# Patient Record
Sex: MALE | Race: WHITE | Employment: FULL TIME | URBAN - METROPOLITAN AREA
[De-identification: names, ages, dates, MRNs, and addresses within clinical notes are randomized per-mention and may not be internally consistent; named-entity substitution may affect disease eponyms.]

---

## 2017-02-27 DIAGNOSIS — N40.1 BENIGN NON-NODULAR PROSTATIC HYPERPLASIA WITH LOWER URINARY TRACT SYMPTOMS: ICD-10-CM

## 2017-02-27 DIAGNOSIS — N52.9 ERECTILE DYSFUNCTION, UNSPECIFIED ERECTILE DYSFUNCTION TYPE: ICD-10-CM

## 2017-02-27 RX ORDER — TADALAFIL 5 MG
TABLET ORAL
Qty: 30 TABLET | Refills: 6 | Status: SHIPPED | OUTPATIENT
Start: 2017-02-27 | End: 2021-08-25

## 2018-09-18 ENCOUNTER — HOSPITAL ENCOUNTER (EMERGENCY)
Facility: CLINIC | Age: 53
Discharge: HOME | End: 2018-09-18
Attending: EMERGENCY MEDICINE
Payer: COMMERCIAL

## 2018-09-18 ENCOUNTER — APPOINTMENT (OUTPATIENT)
Dept: GENERAL RADIOLOGY | Facility: CLINIC | Age: 53
End: 2018-09-18
Payer: COMMERCIAL

## 2018-09-18 VITALS
DIASTOLIC BLOOD PRESSURE: 86 MMHG | HEART RATE: 82 BPM | TEMPERATURE: 98.2 F | OXYGEN SATURATION: 96 % | RESPIRATION RATE: 16 BRPM | SYSTOLIC BLOOD PRESSURE: 138 MMHG | WEIGHT: 165 LBS | HEIGHT: 68 IN | BODY MASS INDEX: 25.01 KG/M2

## 2018-09-18 DIAGNOSIS — S20.212A RIB CONTUSION, LEFT, INITIAL ENCOUNTER: Primary | ICD-10-CM

## 2018-09-18 PROCEDURE — 99283 EMERGENCY DEPT VISIT LOW MDM: CPT

## 2018-09-18 PROCEDURE — 71101 X-RAY EXAM UNILAT RIBS/CHEST: CPT

## 2018-09-18 RX ORDER — IBUPROFEN 800 MG/1
800 TABLET ORAL EVERY 8 HOURS PRN
Qty: 30 TABLET | Refills: 0 | Status: SHIPPED | OUTPATIENT
Start: 2018-09-18 | End: 2019-07-16

## 2018-09-18 ASSESSMENT — PAIN DESCRIPTION - PAIN TYPE: TYPE: ACUTE PAIN

## 2018-09-18 ASSESSMENT — PAIN DESCRIPTION - FREQUENCY: FREQUENCY: CONTINUOUS

## 2018-09-18 ASSESSMENT — PAIN DESCRIPTION - ORIENTATION: ORIENTATION: LEFT

## 2018-09-18 ASSESSMENT — PAIN DESCRIPTION - ONSET: ONSET: SUDDEN

## 2018-09-18 ASSESSMENT — PAIN DESCRIPTION - LOCATION: LOCATION: RIB CAGE

## 2018-09-18 ASSESSMENT — PAIN DESCRIPTION - PROGRESSION: CLINICAL_PROGRESSION: NOT CHANGED

## 2018-09-18 ASSESSMENT — PAIN DESCRIPTION - DESCRIPTORS: DESCRIPTORS: SORE

## 2018-09-18 NOTE — ED PROVIDER NOTES
blood pressure is 138/86 and his pulse is 82. His respiration is 16 and oxygen saturation is 96%. Physical Exam   Constitutional: He is oriented to person, place, and time. He appears well-developed and well-nourished. HENT:   Head: Normocephalic and atraumatic. Mouth/Throat: Oropharynx is clear and moist.   Eyes: Pupils are equal, round, and reactive to light. Conjunctivae and EOM are normal.   Neck: Normal range of motion. Neck supple. No tracheal deviation present. Cardiovascular: Normal rate, regular rhythm and intact distal pulses. Pulmonary/Chest: Effort normal and breath sounds normal. He exhibits tenderness. Left lateral ribs are tender to palpation. Anterior palpation to the chest does not reproduce any pain. Abdominal: Soft. Bowel sounds are normal. He exhibits no distension. There is no tenderness. Musculoskeletal: Normal range of motion. He exhibits no edema or tenderness. Neurological: He is alert and oriented to person, place, and time. Skin: Skin is warm and dry. No rash noted. Psychiatric: He has a normal mood and affect. His behavior is normal. Judgment and thought content normal.   Vitals reviewed. MDM:   Xr Ribs Left Include Chest (min 3 Views)    Result Date: 9/18/2018  EXAMINATION: 2 XRAY VIEWS OF THE LEFT RIBS WITH FRONTAL XRAY VIEW OF THE CHEST 9/18/2018 9:11 am COMPARISON: None. HISTORY: ORDERING SYSTEM PROVIDED HISTORY: left rib pain/trauma TECHNOLOGIST PROVIDED HISTORY: left rib pain/trauma Ordering Physician Provided Reason for Exam: Male, 47, c/o LT side rib pain s/p fall from 8 feet. Pain with cough. Acuity: Acute Type of Exam: Initial Mechanism of Injury: Fall FINDINGS: Frontal view of the chest and dedicated frontal and oblique views of the left chest wall are submitted for review. Cardiac silhouette is normal in size. Lung parenchyma is clear without focal airspace consolidation, sizeable pleural effusion, or pneumothorax. Trachea is midline. Dedicated images of the ribs are without evidence for displaced rib fracture. No radiographic evidence for left rib fracture. No acute cardiopulmonary pathology. Patient has been reassured and I will treat this patient symptomatically. The patient was given the following medications:  Orders Placed This Encounter   Medications    ibuprofen (ADVIL;MOTRIN) 800 MG tablet     Sig: Take 1 tablet by mouth every 8 hours as needed for Pain     Dispense:  30 tablet     Refill:  0          FINAL IMPRESSION      1.  Rib contusion, left, initial encounter          DISPOSITION/PLAN   DISPOSITION Decision To Discharge 09/18/2018 09:44:27 AM      Condition on Disposition  Good    PATIENT REFERRED TO:  Jojo Allen MD  [address] [place] [address]    Schedule an appointment as soon as possible for a visit in 2 days        DISCHARGE MEDICATIONS:  New Prescriptions    IBUPROFEN (ADVIL;MOTRIN) 800 MG TABLET    Take 1 tablet by mouth every 8 hours as needed for Pain       (Please note that portions of this note were completed with a voice recognition program.  Efforts were made to edit the dictations but occasionally words are mis-transcribed.)    Hollis Chambers MD, F.A.C.E.P, F.A.A.E.M  Emergency Physician Attending          Hollis Chambers MD  09/18/18 [id number]

## 2019-07-16 ENCOUNTER — APPOINTMENT (OUTPATIENT)
Dept: CT IMAGING | Facility: CLINIC | Age: 54
End: 2019-07-16
Payer: COMMERCIAL

## 2019-07-16 ENCOUNTER — APPOINTMENT (OUTPATIENT)
Dept: GENERAL RADIOLOGY | Facility: CLINIC | Age: 54
End: 2019-07-16
Payer: COMMERCIAL

## 2019-07-16 ENCOUNTER — HOSPITAL ENCOUNTER (EMERGENCY)
Facility: CLINIC | Age: 54
Discharge: HOME | End: 2019-07-16
Attending: EMERGENCY MEDICINE
Payer: COMMERCIAL

## 2019-07-16 VITALS
BODY MASS INDEX: 25.62 KG/M2 | HEIGHT: 69 IN | SYSTOLIC BLOOD PRESSURE: 133 MMHG | RESPIRATION RATE: 22 BRPM | WEIGHT: 173 LBS | TEMPERATURE: 98.2 F | HEART RATE: 75 BPM | DIASTOLIC BLOOD PRESSURE: 96 MMHG | OXYGEN SATURATION: 99 %

## 2019-07-16 DIAGNOSIS — R05.9 COUGH: Primary | ICD-10-CM

## 2019-07-16 LAB
ABSOLUTE EOS #: 0.1 K/UL (ref 0–0.4)
ABSOLUTE IMMATURE GRANULOCYTE: ABNORMAL K/UL (ref 0–0.3)
ABSOLUTE LYMPH #: 1.7 K/UL (ref 1–4.8)
ABSOLUTE MONO #: 0.7 K/UL (ref 0.1–1.2)
ANION GAP SERPL CALCULATED.3IONS-SCNC: 10 MMOL/L (ref 9–17)
BASOPHILS # BLD: 1 % (ref 0–2)
BASOPHILS ABSOLUTE: 0.1 K/UL (ref 0–0.2)
BUN BLDV-MCNC: 15 MG/DL (ref 6–20)
BUN/CREAT BLD: ABNORMAL (ref 9–20)
CALCIUM SERPL-MCNC: 9.3 MG/DL (ref 8.6–10.4)
CHLORIDE BLD-SCNC: 104 MMOL/L (ref 98–107)
CO2: 26 MMOL/L (ref 20–31)
CREAT SERPL-MCNC: 0.7 MG/DL (ref 0.7–1.2)
DIFFERENTIAL TYPE: ABNORMAL
EOSINOPHILS RELATIVE PERCENT: 1 % (ref 1–4)
GFR AFRICAN AMERICAN: >60 ML/MIN
GFR NON-AFRICAN AMERICAN: >60 ML/MIN
GFR SERPL CREATININE-BSD FRML MDRD: ABNORMAL ML/MIN/{1.73_M2}
GFR SERPL CREATININE-BSD FRML MDRD: ABNORMAL ML/MIN/{1.73_M2}
GLUCOSE BLD-MCNC: 168 MG/DL (ref 70–99)
HCT VFR BLD CALC: 40 % (ref 41–53)
HEMOGLOBIN: 13.3 G/DL (ref 13.5–17.5)
IMMATURE GRANULOCYTES: ABNORMAL %
LYMPHOCYTES # BLD: 17 % (ref 24–44)
MCH RBC QN AUTO: 30.2 PG (ref 26–34)
MCHC RBC AUTO-ENTMCNC: 33.3 G/DL (ref 31–37)
MCV RBC AUTO: 90.6 FL (ref 80–100)
MONOCYTES # BLD: 7 % (ref 2–11)
NRBC AUTOMATED: ABNORMAL PER 100 WBC
PDW BLD-RTO: 13.2 % (ref 12.5–15.4)
PLATELET # BLD: 343 K/UL (ref 140–450)
PLATELET ESTIMATE: ABNORMAL
PMV BLD AUTO: 7 FL (ref 6–12)
POTASSIUM SERPL-SCNC: 3.9 MMOL/L (ref 3.7–5.3)
RBC # BLD: 4.41 M/UL (ref 4.5–5.9)
RBC # BLD: ABNORMAL 10*6/UL
SEG NEUTROPHILS: 74 % (ref 36–66)
SEGMENTED NEUTROPHILS ABSOLUTE COUNT: 7.6 K/UL (ref 1.8–7.7)
SODIUM BLD-SCNC: 140 MMOL/L (ref 135–144)
WBC # BLD: 10.2 K/UL (ref 3.5–11)
WBC # BLD: ABNORMAL 10*3/UL

## 2019-07-16 PROCEDURE — 85025 COMPLETE CBC W/AUTO DIFF WBC: CPT

## 2019-07-16 PROCEDURE — 71250 CT THORAX DX C-: CPT

## 2019-07-16 PROCEDURE — 80048 BASIC METABOLIC PNL TOTAL CA: CPT

## 2019-07-16 PROCEDURE — 99284 EMERGENCY DEPT VISIT MOD MDM: CPT

## 2019-07-16 PROCEDURE — 36415 COLL VENOUS BLD VENIPUNCTURE: CPT

## 2019-07-16 PROCEDURE — 71046 X-RAY EXAM CHEST 2 VIEWS: CPT

## 2019-07-16 RX ORDER — BENZONATATE 100 MG/1
100 CAPSULE ORAL 3 TIMES DAILY PRN
Qty: 30 CAPSULE | Refills: 0 | Status: SHIPPED | OUTPATIENT
Start: 2019-07-16 | End: 2019-07-23

## 2019-07-16 RX ORDER — PREDNISONE 50 MG/1
50 TABLET ORAL DAILY
Qty: 5 TABLET | Refills: 0 | Status: SHIPPED | OUTPATIENT
Start: 2019-07-16 | End: 2021-08-25

## 2019-07-16 RX ORDER — ALBUTEROL SULFATE 90 UG/1
2 AEROSOL, METERED RESPIRATORY (INHALATION) 4 TIMES DAILY
Qty: 1 INHALER | Refills: 0 | Status: SHIPPED | OUTPATIENT
Start: 2019-07-16 | End: 2021-08-25

## 2019-07-16 ASSESSMENT — PAIN SCALES - GENERAL: PAINLEVEL_OUTOF10: 5

## 2019-07-16 ASSESSMENT — PAIN DESCRIPTION - LOCATION: LOCATION: ABDOMEN

## 2019-07-16 ASSESSMENT — PAIN DESCRIPTION - FREQUENCY: FREQUENCY: INTERMITTENT

## 2019-07-16 NOTE — ED PROVIDER NOTES
Eosinophils % 1 1 - 4 %    Basophils 1 0 - 2 %    Immature Granulocytes NOT REPORTED 0 %    Segs Absolute 7.60 1.8 - 7.7 k/uL    Absolute Lymph # 1.70 1.0 - 4.8 k/uL    Absolute Mono # 0.70 0.1 - 1.2 k/uL    Absolute Eos # 0.10 0.0 - 0.4 k/uL    Basophils # 0.10 0.0 - 0.2 k/uL    Absolute Immature Granulocyte NOT REPORTED 0.00 - 0.30 k/uL    WBC Morphology NOT REPORTED     RBC Morphology NOT REPORTED     Platelet Estimate NOT REPORTED    Basic Metabolic Panel   Result Value Ref Range    Glucose 168 (H) 70 - 99 mg/dL    BUN 15 6 - 20 mg/dL    CREATININE 0.70 0.70 - 1.20 mg/dL    Bun/Cre Ratio NOT REPORTED 9 - 20    Calcium 9.3 8.6 - 10.4 mg/dL    Sodium 140 135 - 144 mmol/L    Potassium 3.9 3.7 - 5.3 mmol/L    Chloride 104 98 - 107 mmol/L    CO2 26 20 - 31 mmol/L    Anion Gap 10 9 - 17 mmol/L    GFR Non-African American >60 >60 mL/min    GFR African American >60 >60 mL/min    GFR Comment          GFR Staging NOT REPORTED        Not indicated unless otherwise documented above    RADIOLOGY:   I reviewed the radiologist interpretations:    CT CHEST WO CONTRAST   Preliminary Result   Pleural thickening seen within the left lung base with calcifications seen   along the pleural surface may be secondary to prior asbestos exposure      Bilateral pulmonary nodules largest measuring 7 mm in diameter      Left-sided pleural effusion      RECOMMENDATIONS:   Multiple pulmonary nodules. Most severe: 7.0 mm solid pulmonary nodule. Recommend a non-contrast Chest CT at 3-6 months, then consider another   non-contrast Chest CT at 18-24 months. These guidelines do not apply to patients younger than 35 years,   immunocompromised patients, and patients with cancer. Follow up in patients   with significant comorbidities as clinically warranted. For lung cancer   screening, adhere to Lung-RADS guidelines. Reference: Radiology. 2017;   284(1):228-43.          XR CHEST STANDARD (2 VW)   Final Result   Small left effusion and

## 2021-08-25 ENCOUNTER — OFFICE VISIT (OUTPATIENT)
Dept: FAMILY MEDICINE CLINIC | Age: 56
End: 2021-08-25
Payer: COMMERCIAL

## 2021-08-25 VITALS
TEMPERATURE: 98.1 F | BODY MASS INDEX: 23.91 KG/M2 | HEART RATE: 82 BPM | DIASTOLIC BLOOD PRESSURE: 60 MMHG | OXYGEN SATURATION: 96 % | HEIGHT: 70 IN | SYSTOLIC BLOOD PRESSURE: 102 MMHG | WEIGHT: 167 LBS

## 2021-08-25 DIAGNOSIS — N40.0 BENIGN PROSTATIC HYPERPLASIA, UNSPECIFIED WHETHER LOWER URINARY TRACT SYMPTOMS PRESENT: ICD-10-CM

## 2021-08-25 DIAGNOSIS — M54.41 CHRONIC RIGHT-SIDED LOW BACK PAIN WITH RIGHT-SIDED SCIATICA: ICD-10-CM

## 2021-08-25 DIAGNOSIS — Z23 IMMUNIZATION DUE: ICD-10-CM

## 2021-08-25 DIAGNOSIS — Z12.11 SCREENING FOR COLON CANCER: ICD-10-CM

## 2021-08-25 DIAGNOSIS — G89.29 CHRONIC RIGHT-SIDED LOW BACK PAIN WITH RIGHT-SIDED SCIATICA: ICD-10-CM

## 2021-08-25 DIAGNOSIS — N52.9 ERECTILE DYSFUNCTION, UNSPECIFIED ERECTILE DYSFUNCTION TYPE: ICD-10-CM

## 2021-08-25 DIAGNOSIS — Z11.59 NEED FOR HEPATITIS C SCREENING TEST: ICD-10-CM

## 2021-08-25 DIAGNOSIS — H00.012 HORDEOLUM EXTERNUM OF RIGHT LOWER EYELID: ICD-10-CM

## 2021-08-25 DIAGNOSIS — Z76.89 ENCOUNTER TO ESTABLISH CARE WITH NEW DOCTOR: ICD-10-CM

## 2021-08-25 DIAGNOSIS — E78.5 DYSLIPIDEMIA: Primary | ICD-10-CM

## 2021-08-25 DIAGNOSIS — E11.9 TYPE 2 DIABETES MELLITUS WITHOUT COMPLICATION, WITHOUT LONG-TERM CURRENT USE OF INSULIN (HCC): ICD-10-CM

## 2021-08-25 PROCEDURE — 90471 IMMUNIZATION ADMIN: CPT | Performed by: NURSE PRACTITIONER

## 2021-08-25 PROCEDURE — 90715 TDAP VACCINE 7 YRS/> IM: CPT | Performed by: NURSE PRACTITIONER

## 2021-08-25 PROCEDURE — 99204 OFFICE O/P NEW MOD 45 MIN: CPT | Performed by: NURSE PRACTITIONER

## 2021-08-25 RX ORDER — ERYTHROMYCIN 5 MG/G
OINTMENT OPHTHALMIC
Qty: 1 TUBE | Refills: 0 | Status: SHIPPED | OUTPATIENT
Start: 2021-08-25 | End: 2021-09-04

## 2021-08-25 RX ORDER — NAPROXEN 500 MG/1
500 TABLET ORAL 2 TIMES DAILY WITH MEALS
Qty: 60 TABLET | Refills: 0 | Status: SHIPPED | OUTPATIENT
Start: 2021-08-25 | End: 2022-03-28

## 2021-08-25 SDOH — ECONOMIC STABILITY: FOOD INSECURITY: WITHIN THE PAST 12 MONTHS, YOU WORRIED THAT YOUR FOOD WOULD RUN OUT BEFORE YOU GOT MONEY TO BUY MORE.: NEVER TRUE

## 2021-08-25 SDOH — ECONOMIC STABILITY: TRANSPORTATION INSECURITY
IN THE PAST 12 MONTHS, HAS THE LACK OF TRANSPORTATION KEPT YOU FROM MEDICAL APPOINTMENTS OR FROM GETTING MEDICATIONS?: NO

## 2021-08-25 SDOH — ECONOMIC STABILITY: FOOD INSECURITY: WITHIN THE PAST 12 MONTHS, THE FOOD YOU BOUGHT JUST DIDN'T LAST AND YOU DIDN'T HAVE MONEY TO GET MORE.: NEVER TRUE

## 2021-08-25 SDOH — ECONOMIC STABILITY: TRANSPORTATION INSECURITY
IN THE PAST 12 MONTHS, HAS LACK OF TRANSPORTATION KEPT YOU FROM MEETINGS, WORK, OR FROM GETTING THINGS NEEDED FOR DAILY LIVING?: NO

## 2021-08-25 ASSESSMENT — PATIENT HEALTH QUESTIONNAIRE - PHQ9
SUM OF ALL RESPONSES TO PHQ QUESTIONS 1-9: 0
SUM OF ALL RESPONSES TO PHQ QUESTIONS 1-9: 0
2. FEELING DOWN, DEPRESSED OR HOPELESS: 0
SUM OF ALL RESPONSES TO PHQ9 QUESTIONS 1 & 2: 0
SUM OF ALL RESPONSES TO PHQ QUESTIONS 1-9: 0
1. LITTLE INTEREST OR PLEASURE IN DOING THINGS: 0

## 2021-08-25 ASSESSMENT — SOCIAL DETERMINANTS OF HEALTH (SDOH): HOW HARD IS IT FOR YOU TO PAY FOR THE VERY BASICS LIKE FOOD, HOUSING, MEDICAL CARE, AND HEATING?: NOT HARD AT ALL

## 2021-08-25 NOTE — PROGRESS NOTES
[facility]  [id number] [facility]. [address]  [phone]  [phone]    Carmen Jain is a 55 y.o. male who is here with c/o of:    Chief Complaint: [address] (previous pcp Dr Tiffani Be) and Hip Pain (right hip pain, sxs 1.5 month)      Patient Accompanied by: n/a    HPI - Carmen Jain is here today with c/o:    Patient here to establish care. Previous PCP: Dr Tiffani Be  : No  Children: Yes  Employed: Local   Exercise: No stays active- does do exercises routinely for sciatic pain  Diet: Eats a balanced diet  Smoker: Yes; 1 pk per day  Alcohol: No  Sleep: Averages 6 hours    Chronic Conditions:    Hyperlipidemia  Diabetes  ED  BPH    Specialists:    None    Health Concerns:    Reports right lower back pain with right sided sciatica since he was 26years old. He says that the pain uses to radiate all the way down to his foot but now it radiates around down the thigh. Describes pain as sharp. He says he gets a cramping sensation and it makes it hard to do certain movements. Does not take any OTC medication for his pain. He has been doing sciatica stretches. Denies saddle anesthesia or any issues with urination.      Health Maintenance Due   Topic Date Due    Hepatitis C screen  Never done    Diabetic foot exam  Never done    Diabetic microalbuminuria test  Never done    Colon cancer screen colonoscopy  Never done    A1C test (Diabetic or Prediabetic)  11/24/2016    Lipid screen  11/24/2016    Low dose CT lung screening  07/16/2020        Patient Active Problem List:     Diabetes mellitus ([address]     Dyslipidemia     BPH (benign prostatic hyperplasia)     ED (erectile dysfunction)     Past Medical History:   Diagnosis Date    Erectile dysfunction       Past Surgical History:   Procedure Laterality Date    ARM SURGERY      left forearm     Family History   Problem Relation Age of Onset    Diabetes Father     Heart Disease Father    [facility] Coron[facility] Art Dis Father     Cancer Mother      Social History     Tobacco Use    Smoking status: Current Every Day Smoker     Packs/day: 1.00     Years: 40.00     Pack years: 40.00     Types: Cigarettes    Smokeless tobacco: Never Used   Substance Use Topics    Alcohol use: No     ALLERGIES:  No Known Allergies       Subjective   Review of Systems   · Constitutional:  Negative for activity change, appetite change,unexpected weight change, chills, fever, and fatigue. · HENT: Negative for ear pain, sore throat,  Rhinorrhea, sinus pain, sinus pressure, congestion. · Eyes:  Negative for pain and discharge. · Respiratory:  Negative for chest tightness, shortness of breath, wheezing, and cough. · Cardiovascular:  Negative for chest pain, palpitations and leg swelling. · Gastrointestinal: Negative for abdominal pain, blood in stool, constipation,diarrhea, nausea and vomiting. · Endocrine: Negative for cold intolerance, heat intolerance, polydipsia, polyphagia and polyuria. · Genitourinary: Negative for difficulty urinating, dysuria, flank pain, frequency, hematuria and urgency. · Musculoskeletal: Negative for arthralgias,joint swelling, myalgias, neck pain and neck stiffness. Positive for back pain  · Skin: Negative for rash and wound. · Allergic/Immunologic: Negative for environmental allergies and food allergies. · Neurological:  Negative for dizziness, light-headedness, numbness and headaches. · Hematological:  Negative for adenopathy. Does not bruise/bleed easily. · Psychiatric/Behavioral: Negative for self-injury, sleep disturbance and suicidal ideas. Objective   Physical Exam  Musculoskeletal:      Lumbar back: Tenderness present. Decreased range of motion. Positive right straight leg raise test.        PHYSICAL EXAM:   · Constitutional: Vannesa Garland is oriented to person, place, and time. Vital signs are normal. Appears well-developed and well-nourished.    · HEENT:   · Head: Normocephalic and atraumatic. Right Ear: Hearing and external ear normal. TM normal  Canal normal  · Left Ear: Hearing and external ear normal. TM normal Canal normal  · Nose: Nares normal. Septum midline. No drainage or sinus tenderness. Mucosa pink and moist  · Mouth/Throat: Oropharynx- No erythema, no exudate. Uvula midline, no erythema, no edema. Mucous membranes are pink and moist.   · Eyes:PERRL, EOMI, Conjunctiva normal, No discharge. · Neck: Full passive range of motion. Non-tender on palpation. Neck supple. No thyromegaly present. Trachea normal.  · Cardiovascular: Normal rate, regular rhythm, S1, S2, no murmur, no gallop, no friction rub, intact distal pulses. · Pulmonary/Chest: Breath sounds are clear throughout, No respiratory distress, No wheezing, No chest tenderness. Effort normal  · Abdominal: Soft. Normal appearance, bowel sounds are present and normoactive. There is no hepatosplenomegaly. There is no tenderness. There is no CVA tenderness. · Lymphadenopathy: No lymphadenopathy noted. · Neurological: Alert and oriented to person, place, and time. Normal motor function, Normal sensory function, No focal deficits noted. He has normal strength. · Skin: Skin is warm, dry and intact. No obvious lesions on exposed skin  · Psychiatric: Normal mood and affect. Speech is normal and behavior is normal.     Nursing note and vitals reviewed. Blood pressure 102/60, pulse 82, temperature 98.1 °F (36.7 °C), temperature source Temporal, height 5' 10\" (1.778 m), weight 167 lb (75.8 kg), SpO2 96 %. Body mass index is 23.96 kg/m².     Wt Readings from Last 3 Encounters:   08/25/21 167 lb (75.8 kg)   07/16/19 173 lb (78.5 kg)   09/18/18 165 lb (74.8 kg)     BP Readings from Last 3 Encounters:   08/25/21 102/60   07/16/19 (!) 133/96   09/18/18 138/86       Admission on 07/16/2019, Discharged on 07/16/2019   Component Date Value Ref Range Status    WBC 07/16/2019 10.2  3.5 - 11.0 k/uL Final    RBC 07/16/2019 4.41* 4.5 - 5.9 m/uL Final    Hemoglobin 07/16/2019 13.3* 13.5 - 17.5 g/dL Final    Hematocrit 07/16/2019 40.0* 41 - 53 % Final    MCV 07/16/2019 90.6  80 - 100 fL Final    MCH 07/16/2019 30.2  26 - 34 pg Final    MCHC 07/16/2019 33.3  31 - 37 g/dL Final    RDW 07/16/2019 13.2  12.5 - 15.4 % Final    Platelets 60/59/0755 343  140 - 450 k/uL Final    MPV 07/16/2019 7.0  6.0 - 12.0 fL Final    NRBC Automated 07/16/2019 NOT REPORTED  per 100 WBC Final    Differential Type 07/16/2019 NOT REPORTED   Final    Seg Neutrophils 07/16/2019 74* 36 - 66 % Final    Lymphocytes 07/16/2019 17* 24 - 44 % Final    Monocytes 07/16/2019 7  2 - 11 % Final    Eosinophils % 07/16/2019 1  1 - 4 % Final    Basophils 07/16/2019 1  0 - 2 % Final    Immature Granulocytes 07/16/2019 NOT REPORTED  0 % Final    Segs Absolute 07/16/2019 7.60  1.8 - 7.7 k/uL Final    Absolute Lymph # 07/16/2019 1.70  1.0 - 4.8 k/uL Final    Absolute Mono # 07/16/2019 0.70  0.1 - 1.2 k/uL Final    Absolute Eos # 07/16/2019 0.10  0.0 - 0.4 k/uL Final    Basophils Absolute 07/16/2019 0.10  0.0 - 0.2 k/uL Final    Absolute Immature Granulocyte 07/16/2019 NOT REPORTED  0.00 - 0.30 k/uL Final    WBC Morphology 07/16/2019 NOT REPORTED   Final    RBC Morphology 07/16/2019 NOT REPORTED   Final    Platelet Estimate 08/55/5366 NOT REPORTED   Final    Glucose 07/16/2019 168* 70 - 99 mg/dL Final    BUN 07/16/2019 15  6 - 20 mg/dL Final    CREATININE 07/16/2019 0.70  0.70 - 1.20 mg/dL Final    Bun/Cre Ratio 07/16/2019 NOT REPORTED  9 - 20 Final    Calcium 07/16/2019 9.3  8.6 - 10.4 mg/dL Final    Sodium 07/16/2019 140  135 - 144 mmol/L Final    Potassium 07/16/2019 3.9  3.7 - 5.3 mmol/L Final    Chloride 07/16/2019 104  98 - 107 mmol/L Final    CO2 07/16/2019 26  20 - 31 mmol/L Final    Anion Gap 07/16/2019 10  9 - 17 mmol/L Final    GFR Non- 07/16/2019 >60  >60 mL/min Final    GFR  07/16/2019 >60  >60 mL/min Final  GFR Comment 07/16/2019        Final    Comment: Average GFR for 52-63 years old:   80 mL/min/1.73sq m  Chronic Kidney Disease:   <60 mL/min/1.73sq m  Kidney failure:   <15 mL/min/1.73sq m              eGFR calculated using average adult body mass. Additional eGFR calculator available at:        [employer]            GFR Staging 07/16/2019 NOT REPORTED   Final     No results found for this visit on 08/25/21. Completed Orders/Prescriptions   Orders Placed This Encounter   Medications    naproxen (NAPROSYN) 500 MG tablet     Sig: Take 1 tablet by mouth 2 times daily (with meals)     Dispense:  60 tablet     Refill:  0    erythromycin (ROMYCIN) 5 MG/GM ophthalmic ointment     Sig: Apply thin ribbon along right lower eye lid 3 times daily     Dispense:  1 Tube     Refill:  0           Immunizations Administered     Name Date Dose Route    Tdap (Boostrix, Adacel) 8/25/2021 0.5 mL Intramuscular    Site: Deltoid- Left    Lot: [id number]    NDC: 31909-507-79          AssessmentPlan/Medical Decision Making     1. Dyslipidemia    - Low cholesterol diet recommended  - Lipid, Fasting; Future  - CBC Auto Differential; Future  - Comprehensive Metabolic Panel; Future  - TSH with Reflex; Future    2. Type 2 diabetes mellitus without complication, without long-term current use of insulin (HCC)    - CBC Auto Differential; Future  - Comprehensive Metabolic Panel; Future  - TSH with Reflex; Future  - Hemoglobin A1C; Future  - Microalbumin, Ur; Future    3. Chronic right-sided low back pain with right-sided sciatica    - XR LUMBAR SPINE (2-3 VIEWS); Future  - naproxen (NAPROSYN) 500 MG tablet; Take 1 tablet by mouth 2 times daily (with meals)  Dispense: 60 tablet; Refill: 0    4. Hordeolum externum of right lower eyelid    - Warm compresses recommended TID  - erythromycin (ROMYCIN) 5 MG/GM ophthalmic ointment;  Apply thin ribbon along right lower eye lid 3 times daily  Dispense: 1 Tube; Refill: 0    5. Benign prostatic hyperplasia, unspecified whether lower urinary tract symptoms present      6. Erectile dysfunction, unspecified erectile dysfunction type      7. Need for hepatitis C screening test    - Hepatitis C Antibody; Future    8. Immunization due    - Tdap (age 6y and older) IM ([address])    5. Screening for colon cancer    - Cologuard (For External Results Only); Future    10. Encounter to establish care with new doctor        Return in about 6 months (around 2/25/2022) for chronic conditions. 1.  Ramu received counseling on the following healthy behaviors: nutrition, exercise and tobacco cessation  2. Patient given educational materials - see patient instructions  3. Was a self-tracking handout given in paper form or via [employer]? No  If yes, see orders or list here. 4.  Discussed use, benefit, and side effects of prescribed medications. Barriers to medication compliance addressed. All patient questions answered. Pt voiced understanding. 5.  Reviewed prior labs, imaging, consultation, follow up, and health maintenance  6. Continue current medications, diet and exercise. 7. Discussed use, benefit, and side effects of prescribed medications. Barriers to medication compliance addressed. All her questions were answered. Pt voiced understanding. Silverio Peng will continue current medications, diet and exercise. Of the 45 minute duration appointment visit, Cherelle Hernadez CNP spent at least 50% of the face-to-face time in counseling, explanation of diagnosis, planning of further management, and answering all questions.           Signed:  Cherelle Hernadez CNP

## 2021-10-28 ENCOUNTER — TELEPHONE (OUTPATIENT)
Dept: FAMILY MEDICINE CLINIC | Age: 56
End: 2021-10-28

## 2021-10-28 NOTE — TELEPHONE ENCOUNTER
Patients provider ordered cologuard on 8/25/2021. Pt states he never received kit, I will refax order to [facility].

## 2022-03-28 ENCOUNTER — OFFICE VISIT (OUTPATIENT)
Dept: FAMILY MEDICINE CLINIC | Age: 57
End: 2022-03-28
Payer: COMMERCIAL

## 2022-03-28 VITALS
HEART RATE: 89 BPM | OXYGEN SATURATION: 97 % | BODY MASS INDEX: 24.68 KG/M2 | WEIGHT: 172 LBS | SYSTOLIC BLOOD PRESSURE: 148 MMHG | TEMPERATURE: 98 F | DIASTOLIC BLOOD PRESSURE: 84 MMHG

## 2022-03-28 DIAGNOSIS — E11.9 TYPE 2 DIABETES MELLITUS WITHOUT COMPLICATION, WITHOUT LONG-TERM CURRENT USE OF INSULIN (HCC): Primary | ICD-10-CM

## 2022-03-28 DIAGNOSIS — Z23 IMMUNIZATION DUE: ICD-10-CM

## 2022-03-28 DIAGNOSIS — Z12.11 SCREENING FOR MALIGNANT NEOPLASM OF COLON: ICD-10-CM

## 2022-03-28 DIAGNOSIS — E78.5 DYSLIPIDEMIA: ICD-10-CM

## 2022-03-28 PROCEDURE — 90732 PPSV23 VACC 2 YRS+ SUBQ/IM: CPT | Performed by: NURSE PRACTITIONER

## 2022-03-28 PROCEDURE — 99214 OFFICE O/P EST MOD 30 MIN: CPT | Performed by: NURSE PRACTITIONER

## 2022-03-28 PROCEDURE — 90471 IMMUNIZATION ADMIN: CPT | Performed by: NURSE PRACTITIONER

## 2022-03-28 NOTE — PROGRESS NOTES
[facility] Hospital[facility]  [id number] [facility]. [address]  [place] [address]  [phone]  [phone]    Brayan Rodriguez is a 55 y.o. male who is here with c/o of:    Chief Complaint: Diabetes      Patient Accompanied by: n/a    HPI - Brayan Rodriguez is here today with c/o:    Patient here for re evaluation of chronic conditions. Type 2 Diabetes-  No medication at this time. Does not check blood sugars. Tries to follow balanced diet. No exercise but does stay active. Hyperlipidemia-  No medication at this time. Due for labs. 1 pk per day smoker. Stays active and tries to follow a balanced diet. Elevated blood pressure-  Patients bp elevated. Denies chest pain, dizziness, shortness of breath, headaches or swelling. No hx of HTN.       Health Maintenance Due   Topic Date Due    Hepatitis C screen  Never done    Diabetic foot exam  Never done    Diabetic microalbuminuria test  Never done    Diabetic retinal exam  Never done    Colorectal Cancer Screen  Never done    A1C test (Diabetic or Prediabetic)  11/24/2016    Lipid screen  11/24/2016    Low dose CT lung screening  07/16/2020        Patient Active Problem List:     Diabetes mellitus ([address]     Dyslipidemia     BPH (benign prostatic hyperplasia)     ED (erectile dysfunction)     Past Medical History:   Diagnosis Date    Erectile dysfunction       Past Surgical History:   Procedure Laterality Date    ARM SURGERY      left forearm     Family History   Problem Relation Age of Onset    Diabetes Father     Heart Disease Father     Coronary Art Dis Father     Cancer Mother      Social History     Tobacco Use    Smoking status: Current Every Day Smoker     Packs/day: 1.00     Years: 40.00     Pack years: 40.00     Types: Cigarettes    Smokeless tobacco: Never Used   Substance Use Topics    Alcohol use: No     ALLERGIES:  No Known Allergies       Subjective   Review of Systems   · Constitutional:  Negative for activity change, appetite change,unexpected weight change, chills, fever, and fatigue. · HENT: Negative for ear pain, sore throat,  Rhinorrhea, sinus pain, sinus pressure, congestion. · Eyes:  Negative for pain and discharge. · Respiratory:  Negative for chest tightness, shortness of breath, wheezing, and cough. · Cardiovascular:  Negative for chest pain, palpitations and leg swelling. · Gastrointestinal: Negative for abdominal pain, blood in stool, constipation,diarrhea, nausea and vomiting. · Endocrine: Negative for cold intolerance, heat intolerance, polydipsia, polyphagia and polyuria. · Genitourinary: Negative for difficulty urinating, dysuria, flank pain, frequency, hematuria and urgency. · Musculoskeletal: Negative for arthralgias, back pain, joint swelling, myalgias, neck pain and neck stiffness. · Skin: Negative for rash and wound. · Allergic/Immunologic: Negative for environmental allergies and food allergies. · Neurological:  Negative for dizziness, light-headedness, numbness and headaches. · Hematological:  Negative for adenopathy. Does not bruise/bleed easily. · Psychiatric/Behavioral: Negative for self-injury, sleep disturbance and suicidal ideas. Objective   Physical Exam   PHYSICAL EXAM:   · Constitutional: Samantha is oriented to person, place, and time. Vital signs are normal. Appears well-developed and well-nourished. · HEENT:   · Head: Normocephalic and atraumatic. Eyes:PERRL, EOMI, Conjunctiva normal, No discharge. · Neck: Full passive range of motion. Non-tender on palpation. Neck supple. No thyromegaly present. Trachea normal.  · Cardiovascular: Normal rate, regular rhythm, S1, S2, no murmur, no gallop, no friction rub, intact distal pulses. · Pulmonary/Chest: Breath sounds are clear throughout, No respiratory distress, No wheezing, No chest tenderness. Effort normal  · Musculoskeletal: Extremities appear regular and symmetric.  No evident masses, lesions, foreign bodies, or other abnormalities. No edema. No tenderness on palpation. Joints are stable. Full ROM, strength and tone are within normal limits. · Neurological: Alert and oriented to person, place, and time. Normal motor function, Normal sensory function, No focal deficits noted. He has normal strength. · Skin: Skin is warm, dry and intact. No obvious lesions on exposed skin  · Psychiatric: Normal mood and affect. Speech is normal and behavior is normal.     Nursing note and vitals reviewed. Blood pressure (!) 148/84, pulse 89, temperature 98 °F (36.7 °C), temperature source Temporal, weight 172 lb (78 kg), SpO2 97 %. Body mass index is 24.68 kg/m².     Wt Readings from Last 3 Encounters:   03/28/22 172 lb (78 kg)   08/25/21 167 lb (75.8 kg)   07/16/19 173 lb (78.5 kg)     BP Readings from Last 3 Encounters:   03/28/22 (!) 148/84   08/25/21 102/60   07/16/19 (!) 133/96       Admission on 07/16/2019, Discharged on 07/16/2019   Component Date Value Ref Range Status    WBC 07/16/2019 10.2  3.5 - 11.0 k/uL Final    RBC 07/16/2019 4.41* 4.5 - 5.9 m/uL Final    Hemoglobin 07/16/2019 13.3* 13.5 - 17.5 g/dL Final    Hematocrit 07/16/2019 40.0* 41 - 53 % Final    MCV 07/16/2019 90.6  80 - 100 fL Final    MCH 07/16/2019 30.2  26 - 34 pg Final    MCHC 07/16/2019 33.3  31 - 37 g/dL Final    RDW 07/16/2019 13.2  12.5 - 15.4 % Final    Platelets 06/94/2936 343  140 - 450 k/uL Final    MPV 07/16/2019 7.0  6.0 - 12.0 fL Final    NRBC Automated 07/16/2019 NOT REPORTED  per 100 WBC Final    Differential Type 07/16/2019 NOT REPORTED   Final    Seg Neutrophils 07/16/2019 74* 36 - 66 % Final    Lymphocytes 07/16/2019 17* 24 - 44 % Final    Monocytes 07/16/2019 7  2 - 11 % Final    Eosinophils % 07/16/2019 1  1 - 4 % Final    Basophils 07/16/2019 1  0 - 2 % Final    Immature Granulocytes 07/16/2019 NOT REPORTED  0 % Final    Segs Absolute 07/16/2019 7.60  1.8 - 7.7 k/uL Final    Absolute Lymph # 07/16/2019 1.70  1.0 - ([facility]    - Will check hgba1c  - CBC with Auto Differential; Future  - Comprehensive Metabolic Panel; Future  - TSH with Reflex; Future  - Lipid, Fasting; Future  - Hemoglobin A1C; Future  - Microalbumin, Ur; Future    2. Dyslipidemia    - CBC with Auto Differential; Future  - Comprehensive Metabolic Panel; Future  - TSH with Reflex; Future  - Lipid, Fasting; Future    3. Screening for malignant neoplasm of colon    - Fecal DNA Colorectal cancer screening (Cologuard)    4. Immunization due    - PNEUMOVAX 23 subcutaneous/IM (Pneumococcal polysaccharide vaccine 23-valent >= 3yo)      Return in about 3 months (around 6/28/2022) for chronic conditions. 1.  Ramu received counseling on the following healthy behaviors: nutrition, exercise, medication adherence and tobacco cessation  2. Patient given educational materials - see patient instructions  3. Was a self-tracking handout given in paper form or via [employer]? No  If yes, see orders or list here. 4.  Discussed use, benefit, and side effects of prescribed medications. Barriers to medication compliance addressed. All patient questions answered. Pt voiced understanding. 5.  Reviewed prior labs, imaging, consultation, follow up, and health maintenance  6. Continue current medications, diet and exercise. 7. Discussed use, benefit, and side effects of prescribed medications. Barriers to medication compliance addressed. All her questions were answered. Pt voiced understanding. Marva Hong will continue current medications, diet and exercise. Of the 30 minute duration appointment visit, Jamaica Mitchell CNP spent at least 50% of the face-to-face time in counseling, explanation of diagnosis, planning of further management, and answering all questions.           Signed:  Jamaica Mitchell CNP

## 2022-03-31 ENCOUNTER — HOSPITAL ENCOUNTER (OUTPATIENT)
Age: 57
Setting detail: SPECIMEN
Discharge: HOME | End: 2022-03-31

## 2022-03-31 DIAGNOSIS — E78.5 DYSLIPIDEMIA: ICD-10-CM

## 2022-03-31 DIAGNOSIS — E11.9 TYPE 2 DIABETES MELLITUS WITHOUT COMPLICATION, WITHOUT LONG-TERM CURRENT USE OF INSULIN (HCC): ICD-10-CM

## 2022-03-31 LAB
ABSOLUTE EOS #: 0.14 K/UL (ref 0–0.44)
ABSOLUTE IMMATURE GRANULOCYTE: 0.05 K/UL (ref 0–0.3)
ABSOLUTE LYMPH #: 1.95 K/UL (ref 1.1–3.7)
ABSOLUTE MONO #: 0.69 K/UL (ref 0.1–1.2)
ALBUMIN SERPL-MCNC: 4.2 G/DL (ref 3.5–5.2)
ALBUMIN/GLOBULIN RATIO: 1.4 (ref 1–2.5)
ALP BLD-CCNC: 115 U/L (ref 40–129)
ALT SERPL-CCNC: 15 U/L (ref 5–41)
ANION GAP SERPL CALCULATED.3IONS-SCNC: 11 MMOL/L (ref 9–17)
AST SERPL-CCNC: 18 U/L
BASOPHILS # BLD: 1 % (ref 0–2)
BASOPHILS ABSOLUTE: 0.07 K/UL (ref 0–0.2)
BILIRUB SERPL-MCNC: 0.47 MG/DL (ref 0.3–1.2)
BUN BLDV-MCNC: 19 MG/DL (ref 6–20)
CALCIUM SERPL-MCNC: 9.6 MG/DL (ref 8.6–10.4)
CHLORIDE BLD-SCNC: 108 MMOL/L (ref 98–107)
CHOLESTEROL, FASTING: 218 MG/DL
CHOLESTEROL/HDL RATIO: 4.8
CO2: 23 MMOL/L (ref 20–31)
CREAT SERPL-MCNC: 0.88 MG/DL (ref 0.7–1.2)
CREATININE URINE: 255.8 MG/DL (ref 39–259)
EOSINOPHILS RELATIVE PERCENT: 1 % (ref 1–4)
ESTIMATED AVERAGE GLUCOSE: 146 MG/DL
GFR AFRICAN AMERICAN: >60 ML/MIN
GFR NON-AFRICAN AMERICAN: >60 ML/MIN
GFR SERPL CREATININE-BSD FRML MDRD: ABNORMAL ML/MIN/{1.73_M2}
GLUCOSE BLD-MCNC: 143 MG/DL (ref 70–99)
HBA1C MFR BLD: 6.7 % (ref 4–6)
HCT VFR BLD CALC: 47 % (ref 40.7–50.3)
HDLC SERPL-MCNC: 45 MG/DL
HEMOGLOBIN: 15.9 G/DL (ref 13–17)
IMMATURE GRANULOCYTES: 1 %
LDL CHOLESTEROL: 158 MG/DL (ref 0–130)
LYMPHOCYTES # BLD: 18 % (ref 24–43)
MCH RBC QN AUTO: 31.1 PG (ref 25.2–33.5)
MCHC RBC AUTO-ENTMCNC: 33.8 G/DL (ref 28.4–34.8)
MCV RBC AUTO: 91.8 FL (ref 82.6–102.9)
MICROALBUMIN/CREAT 24H UR: <12 MG/L
MICROALBUMIN/CREAT UR-RTO: NORMAL MCG/MG CREAT
MONOCYTES # BLD: 6 % (ref 3–12)
NRBC AUTOMATED: 0 PER 100 WBC
PDW BLD-RTO: 12.7 % (ref 11.8–14.4)
PLATELET # BLD: 370 K/UL (ref 138–453)
PMV BLD AUTO: 9.6 FL (ref 8.1–13.5)
POTASSIUM SERPL-SCNC: 4.1 MMOL/L (ref 3.7–5.3)
RBC # BLD: 5.12 M/UL (ref 4.21–5.77)
SEG NEUTROPHILS: 73 % (ref 36–65)
SEGMENTED NEUTROPHILS ABSOLUTE COUNT: 8.2 K/UL (ref 1.5–8.1)
SODIUM BLD-SCNC: 142 MMOL/L (ref 135–144)
TOTAL PROTEIN: 7.2 G/DL (ref 6.4–8.3)
TRIGLYCERIDE, FASTING: 77 MG/DL
TSH SERPL DL<=0.05 MIU/L-ACNC: 0.95 UIU/ML (ref 0.3–5)
WBC # BLD: 11.1 K/UL (ref 3.5–11.3)

## 2022-04-01 DIAGNOSIS — E78.5 DYSLIPIDEMIA: Primary | ICD-10-CM

## 2022-04-01 RX ORDER — ROSUVASTATIN CALCIUM 10 MG/1
10 TABLET, COATED ORAL NIGHTLY
Qty: 30 TABLET | Refills: 3 | Status: SHIPPED | OUTPATIENT
Start: 2022-04-01

## 2022-05-16 ENCOUNTER — TELEPHONE (OUTPATIENT)
Dept: FAMILY MEDICINE CLINIC | Age: 57
End: 2022-05-16

## 2022-05-17 ENCOUNTER — TELEPHONE (OUTPATIENT)
Dept: FAMILY MEDICINE CLINIC | Age: 57
End: 2022-05-17

## 2022-05-17 DIAGNOSIS — Z12.11 SCREENING FOR MALIGNANT NEOPLASM OF COLON: Primary | ICD-10-CM

## 2022-05-17 NOTE — TELEPHONE ENCOUNTER
----- Message from Miguel Angelaat [address] sent at 5/17/2022  9:50 AM EDT -----  Subject: Message to Provider    QUESTIONS  Information for Provider? Patient called to say that the [address] can be sent to his home address on file. If you have any   additional questions, please call him back   ---------------------------------------------------------------------------  --------------  CALL BACK INFO  What is the best way for the office to contact you? OK to leave message on   voicemail  Preferred Call Back Phone Number? [phone]  ---------------------------------------------------------------------------  --------------  SCRIPT ANSWERS  Relationship to Patient?  Self

## 2022-06-30 ENCOUNTER — OFFICE VISIT (OUTPATIENT)
Dept: FAMILY MEDICINE CLINIC | Age: 57
End: 2022-06-30
Payer: COMMERCIAL

## 2022-06-30 VITALS
OXYGEN SATURATION: 96 % | HEART RATE: 80 BPM | SYSTOLIC BLOOD PRESSURE: 132 MMHG | TEMPERATURE: 98.5 F | BODY MASS INDEX: 22.1 KG/M2 | DIASTOLIC BLOOD PRESSURE: 66 MMHG | WEIGHT: 154 LBS

## 2022-06-30 DIAGNOSIS — E78.5 DYSLIPIDEMIA: ICD-10-CM

## 2022-06-30 DIAGNOSIS — Z72.0 TOBACCO ABUSE: ICD-10-CM

## 2022-06-30 DIAGNOSIS — E11.9 TYPE 2 DIABETES MELLITUS WITHOUT COMPLICATION, WITHOUT LONG-TERM CURRENT USE OF INSULIN (HCC): Primary | ICD-10-CM

## 2022-06-30 DIAGNOSIS — Z12.11 SCREENING FOR MALIGNANT NEOPLASM OF COLON: ICD-10-CM

## 2022-06-30 LAB — HBA1C MFR BLD: 6.4 %

## 2022-06-30 PROCEDURE — 83036 HEMOGLOBIN GLYCOSYLATED A1C: CPT | Performed by: NURSE PRACTITIONER

## 2022-06-30 PROCEDURE — 3044F HG A1C LEVEL LT 7.0%: CPT | Performed by: NURSE PRACTITIONER

## 2022-06-30 PROCEDURE — G0296 VISIT TO DETERM LDCT ELIG: HCPCS | Performed by: NURSE PRACTITIONER

## 2022-06-30 PROCEDURE — 99214 OFFICE O/P EST MOD 30 MIN: CPT | Performed by: NURSE PRACTITIONER

## 2022-06-30 ASSESSMENT — PATIENT HEALTH QUESTIONNAIRE - PHQ9
SUM OF ALL RESPONSES TO PHQ QUESTIONS 1-9: 0
1. LITTLE INTEREST OR PLEASURE IN DOING THINGS: 0
SUM OF ALL RESPONSES TO PHQ9 QUESTIONS 1 & 2: 0
SUM OF ALL RESPONSES TO PHQ QUESTIONS 1-9: 0
2. FEELING DOWN, DEPRESSED OR HOPELESS: 0

## 2022-06-30 NOTE — PROGRESS NOTES
[facility] [address]  [id number] [facility]. Rubia Seek  [place], [address]  [phone]  [phone]    Franchesca Del Angel is a 65 y.o. male who is here with c/o of:    Chief Complaint: Diabetes      Patient Accompanied by: n/a    HPI - Franchesca Del Angel is here today with c/o:    Patient here for re evaluation of chronic conditions.      Type 2 Diabetes-  No medication at this time. Does not check blood sugars. Tries to follow balanced diet. No exercise but does stay active. Today's hgba1c 6.4%     Hyperlipidemia-  Has been trying to take his medication but often forgets. 1 pk per day smoker. Stays active and tries to follow a balanced diet. Health Maintenance Due   Topic Date Due    Diabetic foot exam  Never done    Hepatitis C screen  Never done    Colorectal Cancer Screen  Never done    Prostate Specific Antigen (PSA) Screening or Monitoring  05/11/2016    Low dose CT lung screening  Never done        Patient Active Problem List:     Diabetes mellitus ([address]     Dyslipidemia     BPH (benign prostatic hyperplasia)     ED (erectile dysfunction)     Past Medical History:   Diagnosis Date    Erectile dysfunction       Past Surgical History:   Procedure Laterality Date    ARM SURGERY      left forearm     Family History   Problem Relation Age of Onset    Diabetes Father     Heart Disease Father     Coronary Art Dis Father     Cancer Mother      Social History     Tobacco Use    Smoking status: Current Every Day Smoker     Packs/day: 1.00     Years: 40.00     Pack years: 40.00     Types: Cigarettes    Smokeless tobacco: Never Used   Substance Use Topics    Alcohol use: No     ALLERGIES:  No Known Allergies       Subjective   Review of Systems   · Constitutional:  Negative for activity change, appetite change,unexpected weight change, chills, fever, and fatigue. · HENT: Negative for ear pain, sore throat,  Rhinorrhea, sinus pain, sinus pressure, congestion.     · Eyes:  Negative for pain and discharge. · Respiratory:  Negative for chest tightness, shortness of breath, wheezing, and cough. · Cardiovascular:  Negative for chest pain, palpitations and leg swelling. · Gastrointestinal: Negative for abdominal pain, blood in stool, constipation,diarrhea, nausea and vomiting. · Endocrine: Negative for cold intolerance, heat intolerance, polydipsia, polyphagia and polyuria. · Genitourinary: Negative for difficulty urinating, dysuria, flank pain, frequency, hematuria and urgency. · Musculoskeletal: Negative for arthralgias, back pain, joint swelling, myalgias, neck pain and neck stiffness. · Skin: Negative for rash and wound. · Allergic/Immunologic: Negative for environmental allergies and food allergies. · Neurological:  Negative for dizziness, light-headedness, numbness and headaches. · Hematological:  Negative for adenopathy. Does not bruise/bleed easily. · Psychiatric/Behavioral: Negative for self-injury, sleep disturbance and suicidal ideas. Objective   Physical Exam   PHYSICAL EXAM:   · Constitutional: [address] is oriented to person, place, and time. Vital signs are normal. Appears well-developed and well-nourished. · HEENT:   · Head: Normocephalic and atraumatic. · Eyes:PERRL, EOMI, Conjunctiva normal, No discharge. · Neck: Full passive range of motion. Non-tender on palpation. Neck supple. No thyromegaly present. Trachea normal.  · Cardiovascular: Normal rate, regular rhythm, S1, S2, no murmur, no gallop, no friction rub, intact distal pulses. · Pulmonary/Chest: Breath sounds are clear throughout, No respiratory distress, No wheezing, No chest tenderness. Effort normal  · Abdominal: Soft. Normal appearance, bowel sounds are present and normoactive. There is no hepatosplenomegaly. There is no tenderness. There is no CVA tenderness. · Musculoskeletal: Extremities appear regular and symmetric. No evident masses, lesions, foreign bodies, or other abnormalities.  No edema. No tenderness on palpation. Joints are stable. Full ROM, strength and tone are within normal limits. · Neurological: Alert and oriented to person, place, and time. Normal motor function, Normal sensory function, No focal deficits noted. He has normal strength. · Skin: Skin is warm, dry and intact. No obvious lesions on exposed skin  · Psychiatric: Normal mood and affect. Speech is normal and behavior is normal.     Nursing note and vitals reviewed. Blood pressure 132/66, pulse 80, temperature 98.5 °F (36.9 °C), temperature source Temporal, weight 154 lb (69.9 kg), SpO2 96 %. Body mass index is 22.1 kg/m². Wt Readings from Last 3 Encounters:   06/30/22 154 lb (69.9 kg)   03/28/22 172 lb (78 kg)   08/25/21 167 lb (75.8 kg)     BP Readings from Last 3 Encounters:   06/30/22 132/66   03/28/22 (!) 148/84   08/25/21 102/60       Hospital Outpatient Visit on 03/31/2022   Component Date Value Ref Range Status    TSH 03/31/2022 0.95  0.30 - 5.00 uIU/mL Final    Cholesterol, Fasting 03/31/2022 218* <200 mg/dL Final    Comment:    Cholesterol Guidelines:      <200  Desirable   200-240  Borderline      >240  Undesirable         HDL 03/31/2022 45  >40 mg/dL Final    Comment:    HDL Guidelines:    <40     Undesirable   40-59    Borderline    >59     Desirable         LDL Cholesterol 03/31/2022 158* 0 - 130 mg/dL Final    Comment:    LDL Guidelines:     <100    Desirable   100-129   Near to/above Desirable   130-159   Borderline      >159   Undesirable     Direct (measured) LDL and calculated LDL are not interchangeable tests.  Chol/HDL Ratio 03/31/2022 4.8  <5 Final            Triglyceride, Fasting 03/31/2022 77  <150 mg/dL Final    Comment:    Triglyceride Guidelines:     <150   Desirable   150-199  Borderline   200-499  High     >499   Very high   Based on AHA Guidelines for fasting triglyceride, October 2012.          Hemoglobin A1C 03/31/2022 6.7* 4.0 - 6.0 % Final    Estimated Avg Glucose 03/31/2022 146  mg/dL Final    Comment: The ADA and AACC recommend providing the estimated average glucose result to permit better   patient understanding of their HBA1c result.  Microalb, Ur 03/31/2022 <12  <21 mg/L Final    Creatinine, Ur 03/31/2022 255.8  39.0 - 259.0 mg/dL Final    Microalb/Crt.  Ratio 03/31/2022 Can not be calculated  <17 mcg/mg creat Final    WBC 03/31/2022 11.1  3.5 - 11.3 k/uL Final    RBC 03/31/2022 5.12  4.21 - 5.77 m/uL Final    Hemoglobin 03/31/2022 15.9  13.0 - 17.0 g/dL Final    Hematocrit 03/31/2022 47.0  40.7 - 50.3 % Final    MCV 03/31/2022 91.8  82.6 - 102.9 fL Final    MCH 03/31/2022 31.1  25.2 - 33.5 pg Final    MCHC 03/31/2022 33.8  28.4 - 34.8 g/dL Final    RDW 03/31/2022 12.7  11.8 - 14.4 % Final    Platelets 53/42/9397 370  138 - 453 k/uL Final    MPV 03/31/2022 9.6  8.1 - 13.5 fL Final    NRBC Automated 03/31/2022 0.0  0.0 per 100 WBC Final    Seg Neutrophils 03/31/2022 73* 36 - 65 % Final    Lymphocytes 03/31/2022 18* 24 - 43 % Final    Monocytes 03/31/2022 6  3 - 12 % Final    Eosinophils % 03/31/2022 1  1 - 4 % Final    Basophils 03/31/2022 1  0 - 2 % Final    Immature Granulocytes 03/31/2022 1* 0 % Final    Segs Absolute 03/31/2022 8.20* 1.50 - 8.10 k/uL Final    Absolute Lymph # 03/31/2022 1.95  1.10 - 3.70 k/uL Final    Absolute Mono # 03/31/2022 0.69  0.10 - 1.20 k/uL Final    Absolute Eos # 03/31/2022 0.14  0.00 - 0.44 k/uL Final    Basophils Absolute 03/31/2022 0.07  0.00 - 0.20 k/uL Final    Absolute Immature Granulocyte 03/31/2022 0.05  0.00 - 0.30 k/uL Final    Glucose 03/31/2022 143* 70 - 99 mg/dL Final    BUN 03/31/2022 19  6 - 20 mg/dL Final    CREATININE 03/31/2022 0.88  0.70 - 1.20 mg/dL Final    Calcium 03/31/2022 9.6  8.6 - 10.4 mg/dL Final    Sodium 03/31/2022 142  135 - 144 mmol/L Final    Potassium 03/31/2022 4.1  3.7 - 5.3 mmol/L Final    Chloride 03/31/2022 108* 98 - 107 mmol/L Final    CO2 03/31/2022 23  20 - 31 mmol/L Final    Anion Gap 03/31/2022 11  9 - 17 mmol/L Final    Alkaline Phosphatase 03/31/2022 115  40 - 129 U/L Final    ALT 03/31/2022 15  5 - 41 U/L Final    AST 03/31/2022 18  <40 U/L Final    Total Bilirubin 03/31/2022 0.47  0.3 - 1.2 mg/dL Final    Total Protein 03/31/2022 7.2  6.4 - 8.3 g/dL Final    Albumin 03/31/2022 4.2  3.5 - 5.2 g/dL Final    Albumin/Globulin Ratio 03/31/2022 1.4  1.0 - 2.5 Final    GFR Non- 03/31/2022 >60  >60 mL/min Final    GFR  03/31/2022 >60  >60 mL/min Final    GFR Comment 03/31/2022        Final    Comment: Average GFR for 52-63 years old:   80 mL/min/1.73sq m  Chronic Kidney Disease:   <60 mL/min/1.73sq m  Kidney failure:   <15 mL/min/1.73sq m              eGFR calculated using average adult body mass. Additional eGFR calculator available at:        [employer]             Results for POC orders placed in visit on 06/30/22   POCT glycosylated hemoglobin (Hb A1C)   Result Value Ref Range    Hemoglobin A1C 6.4 %       Completed Orders/Prescriptions   No orders of the defined types were placed in this encounter. AssessmentPlan/Medical Decision Making     1. Type 2 diabetes mellitus without complication, without long-term current use of insulin (HCC)    - Stable  - Diet controlled; hgba1c 6.4%  - POCT glycosylated hemoglobin (Hb A1C)    2. Dyslipidemia    - Continue statin therapy    3. Tobacco abuse    - MI VISIT TO DISCUSS LUNG CA SCREEN W LDCT  - CT Lung Screening;  Future    Low Dose CT (LDCT) Lung Screening criteria met:     Age 50-77(Medicare) or 50-80 (USPST)   Pack year smoking >20   Still smoking or less than 15 year since quit   No sign or symptoms of lung cancer   > 11 months since last LDCT     Risks and benefits of lung cancer screening with LDCT scans discussed:    Significance of positive screen - False-positive LDCT results often occur. 95% of all positive results do not lead to a diagnosis of cancer. Usually further imaging can resolve most false-positive results; however, some patients may require invasive procedures. Over diagnosis risk - 10% to 12% of screen-detected lung cancer cases are over diagnosed--that is, the cancer would not have been detected in the patient's lifetime without the screening. Need for follow up screens annually to continue lung cancer screening effectiveness     Risks associated with radiation from annual LDCT- Radiation exposure is about the same as for a mammogram, which is about 1/3 of the annual background radiation exposure from everyday life. Starting screening at age 54 is not likely to increase cancer risk from radiation exposure. Patients with comorbidities resulting in life expectancy of < 10 years, or that would preclude treatment of an abnormality identified on CT, should not be screened due to lack of benefit. To obtain maximal benefit from this screening, smoking cessation and long-term abstinence from smoking is critical      4. Screening for malignant neoplasm of colon    - Eddie Collier MD, Gastroenterology, [facility]      Return in about 6 months (around 12/30/2022) for chronic conditions. 1.  Ramu received counseling on the following healthy behaviors: nutrition, exercise, medication adherence and tobacco cessation  2. Patient given educational materials - see patient instructions  3. Was a self-tracking handout given in paper form or via [employer]? No  If yes, see orders or list here. 4.  Discussed use, benefit, and side effects of prescribed medications. Barriers to medication compliance addressed. All patient questions answered. Pt voiced understanding. 5.  Reviewed prior labs, imaging, consultation, follow up, and health maintenance  6. Continue current medications, diet and exercise. 7. Discussed use, benefit, and side effects of prescribed medications. Barriers to medication compliance addressed.   All her questions were answered. Pt voiced understanding. Brooke Wadsworth will continue current medications, diet and exercise. Of the 30 minute duration appointment visit, Dominic Hope CNP spent at least 50% of the face-to-face time in counseling, explanation of diagnosis, planning of further management, and answering all questions.           Signed:  Dominic Hope CNP

## 2022-07-06 ENCOUNTER — TELEPHONE (OUTPATIENT)
Dept: GASTROENTEROLOGY | Age: 57
End: 2022-07-06

## 2022-07-06 NOTE — TELEPHONE ENCOUNTER
Writer rec'd referral from PCP, pt needs colon scheduled, writer notes pt is not est with any provider in this practice. 1st attempt; called and LVM for patient regarding colon screen referral.    2nd attempt; mailed colon screen letter to patient's home address.

## 2023-01-20 ENCOUNTER — OFFICE VISIT (OUTPATIENT)
Dept: FAMILY MEDICINE CLINIC | Age: 58
End: 2023-01-20

## 2023-01-20 VITALS
HEIGHT: 68 IN | TEMPERATURE: 97.1 F | BODY MASS INDEX: 24.98 KG/M2 | SYSTOLIC BLOOD PRESSURE: 146 MMHG | DIASTOLIC BLOOD PRESSURE: 91 MMHG | HEART RATE: 85 BPM | WEIGHT: 164.8 LBS | OXYGEN SATURATION: 97 %

## 2023-01-20 DIAGNOSIS — Z72.0 TOBACCO ABUSE DISORDER: ICD-10-CM

## 2023-01-20 DIAGNOSIS — I10 ESSENTIAL HYPERTENSION: ICD-10-CM

## 2023-01-20 DIAGNOSIS — E11.9 TYPE 2 DIABETES MELLITUS WITHOUT COMPLICATION, WITHOUT LONG-TERM CURRENT USE OF INSULIN (HCC): ICD-10-CM

## 2023-01-20 DIAGNOSIS — R29.898 SHOULDER WEAKNESS: ICD-10-CM

## 2023-01-20 DIAGNOSIS — Z13.29 SCREENING FOR THYROID DISORDER: ICD-10-CM

## 2023-01-20 DIAGNOSIS — Z11.4 SCREENING FOR HIV (HUMAN IMMUNODEFICIENCY VIRUS): ICD-10-CM

## 2023-01-20 DIAGNOSIS — Z12.11 ENCOUNTER FOR SCREENING COLONOSCOPY: Primary | ICD-10-CM

## 2023-01-20 DIAGNOSIS — Z71.6 TOBACCO ABUSE COUNSELING: ICD-10-CM

## 2023-01-20 DIAGNOSIS — E78.5 DYSLIPIDEMIA: ICD-10-CM

## 2023-01-20 DIAGNOSIS — R73.9 HYPERGLYCEMIA: ICD-10-CM

## 2023-01-20 DIAGNOSIS — Z11.59 NEED FOR HEPATITIS C SCREENING TEST: ICD-10-CM

## 2023-01-20 DIAGNOSIS — N40.0 BENIGN PROSTATIC HYPERPLASIA, UNSPECIFIED WHETHER LOWER URINARY TRACT SYMPTOMS PRESENT: ICD-10-CM

## 2023-01-20 DIAGNOSIS — E55.9 VITAMIN D DEFICIENCY: ICD-10-CM

## 2023-01-20 DIAGNOSIS — L98.9 SKIN LESION OF LEFT ARM: ICD-10-CM

## 2023-01-20 DIAGNOSIS — Z12.5 SCREENING FOR PROSTATE CANCER: ICD-10-CM

## 2023-01-20 DIAGNOSIS — E53.9 VITAMIN B DEFICIENCY: ICD-10-CM

## 2023-01-20 RX ORDER — CANDESARTAN 16 MG/1
16 TABLET ORAL DAILY
Qty: 90 TABLET | Refills: 0 | Status: SHIPPED | OUTPATIENT
Start: 2023-01-20 | End: 2024-01-20

## 2023-01-20 SDOH — ECONOMIC STABILITY: FOOD INSECURITY: WITHIN THE PAST 12 MONTHS, YOU WORRIED THAT YOUR FOOD WOULD RUN OUT BEFORE YOU GOT MONEY TO BUY MORE.: NEVER TRUE

## 2023-01-20 SDOH — ECONOMIC STABILITY: FOOD INSECURITY: WITHIN THE PAST 12 MONTHS, THE FOOD YOU BOUGHT JUST DIDN'T LAST AND YOU DIDN'T HAVE MONEY TO GET MORE.: NEVER TRUE

## 2023-01-20 ASSESSMENT — PATIENT HEALTH QUESTIONNAIRE - PHQ9
SUM OF ALL RESPONSES TO PHQ9 QUESTIONS 1 & 2: 0
SUM OF ALL RESPONSES TO PHQ QUESTIONS 1-9: 0
2. FEELING DOWN, DEPRESSED OR HOPELESS: 0
1. LITTLE INTEREST OR PLEASURE IN DOING THINGS: 0
2. FEELING DOWN, DEPRESSED OR HOPELESS: 0
1. LITTLE INTEREST OR PLEASURE IN DOING THINGS: 0
SUM OF ALL RESPONSES TO PHQ QUESTIONS 1-9: 0
SUM OF ALL RESPONSES TO PHQ QUESTIONS 1-9: 0
SUM OF ALL RESPONSES TO PHQ9 QUESTIONS 1 & 2: 0
SUM OF ALL RESPONSES TO PHQ QUESTIONS 1-9: 0

## 2023-01-20 ASSESSMENT — ENCOUNTER SYMPTOMS
EYE DISCHARGE: 0
BACK PAIN: 0
EYE PAIN: 0
BLOOD IN STOOL: 0
SHORTNESS OF BREATH: 0
ABDOMINAL PAIN: 0
COUGH: 0
NAUSEA: 0
STRIDOR: 0
EYE REDNESS: 0
VOMITING: 0
CONSTIPATION: 0
PHOTOPHOBIA: 0
SORE THROAT: 0
DIARRHEA: 0

## 2023-01-20 ASSESSMENT — ANXIETY QUESTIONNAIRES
GAD7 TOTAL SCORE: 0
5. BEING SO RESTLESS THAT IT IS HARD TO SIT STILL: 0
4. TROUBLE RELAXING: 0
1. FEELING NERVOUS, ANXIOUS, OR ON EDGE: 0
2. NOT BEING ABLE TO STOP OR CONTROL WORRYING: 0
6. BECOMING EASILY ANNOYED OR IRRITABLE: 0
3. WORRYING TOO MUCH ABOUT DIFFERENT THINGS: 0
7. FEELING AFRAID AS IF SOMETHING AWFUL MIGHT HAPPEN: 0
IF YOU CHECKED OFF ANY PROBLEMS ON THIS QUESTIONNAIRE, HOW DIFFICULT HAVE THESE PROBLEMS MADE IT FOR YOU TO DO YOUR WORK, TAKE CARE OF THINGS AT HOME, OR GET ALONG WITH OTHER PEOPLE: NOT DIFFICULT AT ALL

## 2023-01-20 ASSESSMENT — SOCIAL DETERMINANTS OF HEALTH (SDOH): HOW HARD IS IT FOR YOU TO PAY FOR THE VERY BASICS LIKE FOOD, HOUSING, MEDICAL CARE, AND HEATING?: SOMEWHAT HARD

## 2023-01-20 NOTE — PROGRESS NOTES
Subjective:      Patient ID: Gisele Marcus is a 65 y.o. male. [facility] was seeing an old PCP for a while- currently off all meds . [facility] over a year ago stopped all meds. [facility] BP was ok in the past.  [facility] has been smoking 1-1.5 PPD x 45 years. States never had CT lung. State she works as a - states he feels weak- arm seems to be giving out -   right arm too starting to cause similar sx around covid. Cyst under left upper arm x 4-5 years - recently started with foul drainage - 4-5 months - wants it removed. Review of Systems   Constitutional:  Negative for chills and fever. HENT:  Negative for congestion, ear pain, hearing loss, nosebleeds, sore throat and tinnitus. Eyes:  Negative for photophobia, pain, discharge and redness. Respiratory:  Negative for cough, shortness of breath and stridor. Cardiovascular:  Negative for chest pain, palpitations and leg swelling. Gastrointestinal:  Negative for abdominal pain, blood in stool, constipation, diarrhea, nausea and vomiting. Endocrine: Negative for polydipsia. Genitourinary:  Negative for dysuria, flank pain, frequency, hematuria and urgency. Musculoskeletal:  Positive for myalgias. Negative for back pain and neck pain. Shoulder weakness L>R   Skin:  Negative for rash. Allergic/Immunologic: Negative for environmental allergies. Neurological:  Negative for dizziness, tremors, seizures, weakness and headaches. Hematological:  Does not bruise/bleed easily. Psychiatric/Behavioral:  Negative for hallucinations and suicidal ideas. The patient is not nervous/anxious. Objective:   Physical Exam  Constitutional:       Appearance: He is well-developed. HENT:      Head: Normocephalic and atraumatic. Nose: Nose normal.      Mouth/Throat:      Mouth: Mucous membranes are moist.      Pharynx: No oropharyngeal exudate. Eyes:      General: No scleral icterus. Right eye: No discharge.          Left eye: No discharge. Conjunctiva/sclera: Conjunctivae normal.      Pupils: Pupils are equal, round, and reactive to light. Neck:      Thyroid: No thyromegaly. Vascular: No JVD. Cardiovascular:      Rate and Rhythm: Normal rate and regular rhythm. Heart sounds: Normal heart sounds. No murmur heard. No friction rub. No gallop. Pulmonary:      Effort: Pulmonary effort is normal. No respiratory distress. Breath sounds: Normal breath sounds. No stridor. No wheezing or rales. Chest:      Chest wall: No tenderness. Abdominal:      General: Abdomen is flat. Bowel sounds are normal. There is no distension. Palpations: Abdomen is soft. There is no mass. Tenderness: There is no abdominal tenderness. There is no guarding or rebound. Musculoskeletal:         General: No tenderness or deformity. Normal range of motion. Cervical back: Normal range of motion and neck supple. Lymphadenopathy:      Cervical: No cervical adenopathy. Skin:     General: Skin is warm and dry. Findings: No erythema or rash. Neurological:      General: No focal deficit present. Mental Status: He is alert and oriented to person, place, and time. Mental status is at baseline. Cranial Nerves: No cranial nerve deficit. Motor: No abnormal muscle tone. Coordination: Coordination normal.      Deep Tendon Reflexes: Reflexes normal.   Psychiatric:         Mood and Affect: Mood normal.         Behavior: Behavior normal.         Thought Content:  Thought content normal.         Judgment: Judgment normal.   Cystic mass with blackhead to upper left arm inner aspect - non tender, non indurated- > Sebascious cyst     Visual inspection:  Deformity/amputation: Neg  Skin lesions/pre-ulcerative calluses: absent  Edema: right- negative, left- negative    Sensory exam:  Monofilament sensation: normal  (minimum of 5 random plantar locations tested, avoiding callused areas - > 1 area with absence of sensation is + for neuropathy)    Plus at least one of the following:  Pulses: normal,   Pinprick: N/A  Proprioception: Intact  Vibration (128 Hz): N/A     Vitals:    01/20/23 1113   BP: (!) 146/91   Pulse: 85   Temp:    SpO2:          Assessment:      Diagnosis Orders   1. Encounter for screening colonoscopy  Adine Felty, MD, Gastroenterology, [facility]      2. Dyslipidemia  Comprehensive Metabolic Panel    Lipid Panel      3. Screening for thyroid disorder  T4, Free    TSH      4. Screening for HIV (human immunodeficiency virus)  HIV Screen      5. Need for hepatitis C screening test  Hepatitis C Antibody      6. Vitamin D deficiency  Vitamin D 25 Hydroxy      7. Vitamin B deficiency  CBC    Folate    Vitamin B12      8. Hyperglycemia  Comprehensive Metabolic Panel    Hemoglobin A1C      9. Screening for prostate cancer  PSA Screening      10. Type 2 diabetes mellitus without complication, without long-term current use of insulin (HCC)  Hemoglobin A1C    Microalbumin, Ur    Microalbumin / Creatinine Urine Ratio     DIABETES FOOT EXAM      11. Essential hypertension        12. Tobacco abuse disorder        13. Tobacco abuse counseling        14.  Benign prostatic hyperplasia, unspecified whether lower urinary tract symptoms present              Plan:     Orders Placed This Encounter   Procedures    CBC    Comprehensive Metabolic Panel    Folate    Hemoglobin A1C    Hepatitis C Antibody    HIV Screen    Lipid Panel    PSA Screening    T4, Free    Vitamin B12    Vitamin D 25 Hydroxy    TSH    Microalbumin, Ur    Microalbumin / Creatinine Urine Ratio    Apple Cedeño MD, Gastroenterology, [address] [place] [address]       Outpatient Encounter Medications as of 1/20/2023   Medication Sig Dispense Refill    [DISCONTINUED] rosuvastatin (CRESTOR) 10 MG tablet Take 1 tablet by mouth nightly (Patient not taking: Reported on 1/20/2023) 30 tablet 3     No facility-administered encounter medications on file as of 1/20/2023.      20 minutes spent with patient counseling/educating on acute/chronic medical health problems, medications,  along with treatment options. Reviewed multiple labs/imaging/medications with patient during this time. Following Diet discussion/education was done on Dash Diet, Diabetic Diet, and Cholesterol Diet . In addition Exercise plan and depression screen were discussed. Recent labs/imaging were discussed and reviewed with patient.

## 2023-01-21 ENCOUNTER — HOSPITAL ENCOUNTER (OUTPATIENT)
Age: 58
Discharge: HOME | End: 2023-01-21
Payer: COMMERCIAL

## 2023-01-21 DIAGNOSIS — Z12.5 SCREENING FOR PROSTATE CANCER: ICD-10-CM

## 2023-01-21 DIAGNOSIS — E11.9 TYPE 2 DIABETES MELLITUS WITHOUT COMPLICATION, WITHOUT LONG-TERM CURRENT USE OF INSULIN (HCC): ICD-10-CM

## 2023-01-21 DIAGNOSIS — E53.9 VITAMIN B DEFICIENCY: ICD-10-CM

## 2023-01-21 DIAGNOSIS — Z11.4 SCREENING FOR HIV (HUMAN IMMUNODEFICIENCY VIRUS): ICD-10-CM

## 2023-01-21 DIAGNOSIS — E55.9 VITAMIN D DEFICIENCY: ICD-10-CM

## 2023-01-21 DIAGNOSIS — Z11.59 NEED FOR HEPATITIS C SCREENING TEST: ICD-10-CM

## 2023-01-21 DIAGNOSIS — Z13.29 SCREENING FOR THYROID DISORDER: ICD-10-CM

## 2023-01-21 DIAGNOSIS — E78.5 DYSLIPIDEMIA: ICD-10-CM

## 2023-01-21 DIAGNOSIS — R73.9 HYPERGLYCEMIA: ICD-10-CM

## 2023-01-21 LAB
ALBUMIN SERPL-MCNC: 4 G/DL (ref 3.5–5.2)
ALBUMIN/GLOBULIN RATIO: 1.3 (ref 1–2.5)
ALP BLD-CCNC: 122 U/L (ref 40–129)
ALT SERPL-CCNC: 28 U/L (ref 5–41)
ANION GAP SERPL CALCULATED.3IONS-SCNC: 13 MMOL/L (ref 9–17)
AST SERPL-CCNC: 15 U/L
BILIRUB SERPL-MCNC: 1 MG/DL (ref 0.3–1.2)
BUN BLDV-MCNC: 17 MG/DL (ref 6–20)
CALCIUM SERPL-MCNC: 9.5 MG/DL (ref 8.6–10.4)
CHLORIDE BLD-SCNC: 107 MMOL/L (ref 98–107)
CHOLESTEROL/HDL RATIO: 5
CHOLESTEROL: 238 MG/DL
CO2: 23 MMOL/L (ref 20–31)
CREAT SERPL-MCNC: 0.84 MG/DL (ref 0.7–1.2)
CREATININE URINE: 357.7 MG/DL (ref 39–259)
FOLATE: 18.6 NG/ML
GFR SERPL CREATININE-BSD FRML MDRD: >60 ML/MIN/1.73M2
GLUCOSE BLD-MCNC: 144 MG/DL (ref 70–99)
HCT VFR BLD CALC: 47.5 % (ref 40.7–50.3)
HDLC SERPL-MCNC: 48 MG/DL
HEMOGLOBIN: 16.2 G/DL (ref 13–17)
HEPATITIS C ANTIBODY: NONREACTIVE
HIV AG/AB: NONREACTIVE
LDL CHOLESTEROL: 162 MG/DL (ref 0–130)
MCH RBC QN AUTO: 30.9 PG (ref 25.2–33.5)
MCHC RBC AUTO-ENTMCNC: 34.1 G/DL (ref 28.4–34.8)
MCV RBC AUTO: 90.5 FL (ref 82.6–102.9)
MICROALBUMIN/CREAT 24H UR: <12 MG/L
MICROALBUMIN/CREAT UR-RTO: ABNORMAL MCG/MG CREAT
NRBC AUTOMATED: 0 PER 100 WBC
PDW BLD-RTO: 12.4 % (ref 11.8–14.4)
PLATELET # BLD: 355 K/UL (ref 138–453)
PMV BLD AUTO: 9.7 FL (ref 8.1–13.5)
POTASSIUM SERPL-SCNC: 4.1 MMOL/L (ref 3.7–5.3)
PROSTATE SPECIFIC ANTIGEN: 1.02 NG/ML
RBC # BLD: 5.25 M/UL (ref 4.21–5.77)
SODIUM BLD-SCNC: 143 MMOL/L (ref 135–144)
THYROXINE, FREE: 1.33 NG/DL (ref 0.93–1.7)
TOTAL PROTEIN: 7 G/DL (ref 6.4–8.3)
TRIGL SERPL-MCNC: 142 MG/DL
TSH SERPL DL<=0.05 MIU/L-ACNC: 1.1 UIU/ML (ref 0.3–5)
VITAMIN B-12: 451 PG/ML (ref 232–1245)
VITAMIN D 25-HYDROXY: 23.8 NG/ML
WBC # BLD: 11.2 K/UL (ref 3.5–11.3)

## 2023-01-21 PROCEDURE — 84443 ASSAY THYROID STIM HORMONE: CPT

## 2023-01-21 PROCEDURE — 86803 HEPATITIS C AB TEST: CPT

## 2023-01-21 PROCEDURE — 80053 COMPREHEN METABOLIC PANEL: CPT

## 2023-01-21 PROCEDURE — G0103 PSA SCREENING: HCPCS

## 2023-01-21 PROCEDURE — 82306 VITAMIN D 25 HYDROXY: CPT

## 2023-01-21 PROCEDURE — 80061 LIPID PANEL: CPT

## 2023-01-21 PROCEDURE — 83036 HEMOGLOBIN GLYCOSYLATED A1C: CPT

## 2023-01-21 PROCEDURE — 82607 VITAMIN B-12: CPT

## 2023-01-21 PROCEDURE — 85027 COMPLETE CBC AUTOMATED: CPT

## 2023-01-21 PROCEDURE — 82043 UR ALBUMIN QUANTITATIVE: CPT

## 2023-01-21 PROCEDURE — 84439 ASSAY OF FREE THYROXINE: CPT

## 2023-01-21 PROCEDURE — 87389 HIV-1 AG W/HIV-1&-2 AB AG IA: CPT

## 2023-01-21 PROCEDURE — 82570 ASSAY OF URINE CREATININE: CPT

## 2023-01-21 PROCEDURE — 82746 ASSAY OF FOLIC ACID SERUM: CPT

## 2023-01-21 PROCEDURE — 36415 COLL VENOUS BLD VENIPUNCTURE: CPT

## 2023-01-22 LAB
ESTIMATED AVERAGE GLUCOSE: 137 MG/DL
HBA1C MFR BLD: 6.4 % (ref 4–6)

## 2023-02-03 ENCOUNTER — OFFICE VISIT (OUTPATIENT)
Dept: FAMILY MEDICINE CLINIC | Age: 58
End: 2023-02-03
Payer: COMMERCIAL

## 2023-02-03 VITALS
HEART RATE: 86 BPM | DIASTOLIC BLOOD PRESSURE: 77 MMHG | BODY MASS INDEX: 26.04 KG/M2 | OXYGEN SATURATION: 95 % | TEMPERATURE: 98.4 F | HEIGHT: 68 IN | SYSTOLIC BLOOD PRESSURE: 122 MMHG | WEIGHT: 171.8 LBS

## 2023-02-03 DIAGNOSIS — E55.9 VITAMIN D DEFICIENCY: ICD-10-CM

## 2023-02-03 DIAGNOSIS — Z72.0 TOBACCO ABUSE DISORDER: ICD-10-CM

## 2023-02-03 DIAGNOSIS — I10 ESSENTIAL HYPERTENSION: Primary | ICD-10-CM

## 2023-02-03 DIAGNOSIS — N40.0 BENIGN PROSTATIC HYPERPLASIA, UNSPECIFIED WHETHER LOWER URINARY TRACT SYMPTOMS PRESENT: ICD-10-CM

## 2023-02-03 DIAGNOSIS — E11.9 TYPE 2 DIABETES MELLITUS WITHOUT COMPLICATION, WITHOUT LONG-TERM CURRENT USE OF INSULIN (HCC): ICD-10-CM

## 2023-02-03 DIAGNOSIS — E78.5 DYSLIPIDEMIA: ICD-10-CM

## 2023-02-03 DIAGNOSIS — Z71.6 TOBACCO ABUSE COUNSELING: ICD-10-CM

## 2023-02-03 PROCEDURE — 99214 OFFICE O/P EST MOD 30 MIN: CPT | Performed by: FAMILY MEDICINE

## 2023-02-03 PROCEDURE — 3074F SYST BP LT 130 MM HG: CPT | Performed by: FAMILY MEDICINE

## 2023-02-03 PROCEDURE — 3044F HG A1C LEVEL LT 7.0%: CPT | Performed by: FAMILY MEDICINE

## 2023-02-03 PROCEDURE — 3078F DIAST BP <80 MM HG: CPT | Performed by: FAMILY MEDICINE

## 2023-02-03 RX ORDER — ERGOCALCIFEROL 1.25 MG/1
50000 CAPSULE ORAL WEEKLY
Qty: 12 CAPSULE | Refills: 1 | Status: SHIPPED | OUTPATIENT
Start: 2023-02-03

## 2023-02-03 RX ORDER — METFORMIN HYDROCHLORIDE 750 MG/1
750 TABLET, EXTENDED RELEASE ORAL
Qty: 90 TABLET | Refills: 0 | Status: SHIPPED | OUTPATIENT
Start: 2023-02-03

## 2023-02-03 RX ORDER — ROSUVASTATIN CALCIUM 5 MG/1
5 TABLET, COATED ORAL NIGHTLY
Qty: 90 TABLET | Refills: 0 | Status: SHIPPED | OUTPATIENT
Start: 2023-02-03

## 2023-02-03 SDOH — ECONOMIC STABILITY: FOOD INSECURITY: WITHIN THE PAST 12 MONTHS, THE FOOD YOU BOUGHT JUST DIDN'T LAST AND YOU DIDN'T HAVE MONEY TO GET MORE.: SOMETIMES TRUE

## 2023-02-03 SDOH — ECONOMIC STABILITY: FOOD INSECURITY: WITHIN THE PAST 12 MONTHS, YOU WORRIED THAT YOUR FOOD WOULD RUN OUT BEFORE YOU GOT MONEY TO BUY MORE.: NEVER TRUE

## 2023-02-03 SDOH — ECONOMIC STABILITY: HOUSING INSECURITY
IN THE LAST 12 MONTHS, WAS THERE A TIME WHEN YOU DID NOT HAVE A STEADY PLACE TO SLEEP OR SLEPT IN A SHELTER (INCLUDING NOW)?: NO

## 2023-02-03 SDOH — ECONOMIC STABILITY: INCOME INSECURITY: HOW HARD IS IT FOR YOU TO PAY FOR THE VERY BASICS LIKE FOOD, HOUSING, MEDICAL CARE, AND HEATING?: HARD

## 2023-02-03 ASSESSMENT — PATIENT HEALTH QUESTIONNAIRE - PHQ9
SUM OF ALL RESPONSES TO PHQ9 QUESTIONS 1 & 2: 0
SUM OF ALL RESPONSES TO PHQ9 QUESTIONS 1 & 2: 0
SUM OF ALL RESPONSES TO PHQ QUESTIONS 1-9: 0
2. FEELING DOWN, DEPRESSED OR HOPELESS: 0
1. LITTLE INTEREST OR PLEASURE IN DOING THINGS: 0
SUM OF ALL RESPONSES TO PHQ QUESTIONS 1-9: 0
1. LITTLE INTEREST OR PLEASURE IN DOING THINGS: 0
2. FEELING DOWN, DEPRESSED OR HOPELESS: 0

## 2023-02-03 ASSESSMENT — ANXIETY QUESTIONNAIRES
1. FEELING NERVOUS, ANXIOUS, OR ON EDGE: 0
IF YOU CHECKED OFF ANY PROBLEMS ON THIS QUESTIONNAIRE, HOW DIFFICULT HAVE THESE PROBLEMS MADE IT FOR YOU TO DO YOUR WORK, TAKE CARE OF THINGS AT HOME, OR GET ALONG WITH OTHER PEOPLE: NOT DIFFICULT AT ALL
3. WORRYING TOO MUCH ABOUT DIFFERENT THINGS: 0
GAD7 TOTAL SCORE: 0
5. BEING SO RESTLESS THAT IT IS HARD TO SIT STILL: 0
4. TROUBLE RELAXING: 0
6. BECOMING EASILY ANNOYED OR IRRITABLE: 0
2. NOT BEING ABLE TO STOP OR CONTROL WORRYING: 0
7. FEELING AFRAID AS IF SOMETHING AWFUL MIGHT HAPPEN: 0

## 2023-02-03 ASSESSMENT — ENCOUNTER SYMPTOMS
STRIDOR: 0
BLOOD IN STOOL: 0
PHOTOPHOBIA: 0
BACK PAIN: 0
ABDOMINAL PAIN: 0
DIARRHEA: 0
VOMITING: 0
COUGH: 0
CONSTIPATION: 0
SORE THROAT: 0
SHORTNESS OF BREATH: 0
EYE PAIN: 0
EYE DISCHARGE: 0
NAUSEA: 0
EYE REDNESS: 0

## 2023-02-03 NOTE — PROGRESS NOTES
Subjective:      Patient ID: Tsering Gagnon is a 65 y.o. male. States has been doing well on atacand- no side effects. BP is WNL. Here to discuss labs- His BS were diabetic range- last years labs show diabetes but never treated. States dad and aunt had diabetes. Mood sleep appetite ok. Labs discussed in detail. Low dos emetformin and crestor started - med effects and side effects discussed. Review of Systems   Constitutional:  Negative for chills and fever. HENT:  Negative for congestion, ear pain, hearing loss, nosebleeds, sore throat and tinnitus. Eyes:  Negative for photophobia, pain, discharge and redness. Respiratory:  Negative for cough, shortness of breath and stridor. Cardiovascular:  Negative for chest pain, palpitations and leg swelling. Gastrointestinal:  Negative for abdominal pain, blood in stool, constipation, diarrhea, nausea and vomiting. Endocrine: Negative for polydipsia. Genitourinary:  Negative for dysuria, flank pain, frequency, hematuria and urgency. Musculoskeletal:  Negative for back pain, myalgias and neck pain. Skin:  Negative for rash. Allergic/Immunologic: Negative for environmental allergies. Neurological:  Negative for dizziness, tremors, seizures, weakness and headaches. Hematological:  Does not bruise/bleed easily. Psychiatric/Behavioral:  Negative for hallucinations and suicidal ideas. The patient is not nervous/anxious. Objective:   Physical Exam  Constitutional:       General: He is not in acute distress. Appearance: He is well-developed. He is not diaphoretic. HENT:      Head: Normocephalic and atraumatic. Right Ear: External ear normal.      Left Ear: External ear normal.      Nose: Nose normal.      Mouth/Throat:      Mouth: Mucous membranes are moist.      Pharynx: No oropharyngeal exudate. Eyes:      General: No scleral icterus. Right eye: No discharge. Left eye: No discharge.       Conjunctiva/sclera: Conjunctivae normal.      Pupils: Pupils are equal, round, and reactive to light.   Neck:      Thyroid: No thyromegaly.      Vascular: No JVD.      Trachea: No tracheal deviation.   Cardiovascular:      Rate and Rhythm: Normal rate and regular rhythm.      Pulses: Normal pulses.      Heart sounds: Normal heart sounds. No murmur heard.    No friction rub. No gallop.   Pulmonary:      Effort: Pulmonary effort is normal. No respiratory distress.      Breath sounds: Normal breath sounds. No wheezing or rales.   Chest:      Chest wall: No tenderness.   Abdominal:      General: Bowel sounds are normal. There is no distension.      Palpations: Abdomen is soft. There is no mass.      Tenderness: There is no abdominal tenderness. There is no guarding or rebound.   Musculoskeletal:         General: No tenderness or deformity. Normal range of motion.      Cervical back: Normal range of motion and neck supple.   Lymphadenopathy:      Cervical: No cervical adenopathy.   Skin:     General: Skin is warm and dry.      Coloration: Skin is not pale.      Findings: No erythema or rash.   Neurological:      General: No focal deficit present.      Mental Status: He is alert and oriented to person, place, and time. Mental status is at baseline.      Cranial Nerves: No cranial nerve deficit.      Motor: No abnormal muscle tone.      Coordination: Coordination normal.      Deep Tendon Reflexes: Reflexes are normal and symmetric. Reflexes normal.   Psychiatric:         Mood and Affect: Mood normal.         Behavior: Behavior normal.         Thought Content: Thought content normal.         Judgment: Judgment normal.        Vitals:    02/03/23 0906   BP: 122/77   Pulse: 86   Temp: 98.4 °F (36.9 °C)   SpO2: 95%         Assessment:      Diagnosis Orders   1. Essential hypertension        2. Tobacco abuse disorder        3. Tobacco abuse counseling        4. Type 2 diabetes mellitus without complication, without long-term current use of insulin (HCC)   metFORMIN (GLUCOPHAGE XR) 750 MG extended release tablet    Comprehensive Metabolic Panel    Hemoglobin A1C    Microalbumin, Ur    Microalbumin / Creatinine Urine Ratio      5. Dyslipidemia  rosuvastatin (CRESTOR) 5 MG tablet    Comprehensive Metabolic Panel    Lipid Panel      6. Benign prostatic hyperplasia, unspecified whether lower urinary tract symptoms present        7. Vitamin D deficiency  vitamin D (ERGOCALCIFEROL) 1.25 MG (67073 UT) CAPS capsule            Plan:     Orders Placed This Encounter   Procedures    Comprehensive Metabolic Panel    Hemoglobin A1C    Lipid Panel    Microalbumin, Ur    Microalbumin / Creatinine Urine Ratio       Outpatient Encounter Medications as of 2/3/2023   Medication Sig Dispense Refill    rosuvastatin (CRESTOR) 5 MG tablet Take 1 tablet by mouth nightly 90 tablet 0    metFORMIN (GLUCOPHAGE XR) 750 MG extended release tablet Take 1 tablet by mouth daily (with breakfast) 90 tablet 0    vitamin D (ERGOCALCIFEROL) 1.25 MG (36652 UT) CAPS capsule Take 1 capsule by mouth once a week 12 capsule 1    candesartan (ATACAND) 16 MG tablet Take 1 tablet by mouth daily 90 tablet 0     No facility-administered encounter medications on file as of 2/3/2023.      20 minutes spent with patient counseling/educating on acute/chronic medical health problems, medications,  along with treatment options. Reviewed multiple labs/imaging/medications with patient during this time. Following Diet discussion/education was done on Dash Diet, Diabetic Diet, and Cholesterol Diet . In addition Exercise plan and depression screen were discussed. Recent labs/imaging were discussed and reviewed with patient.

## 2023-02-13 ENCOUNTER — HOSPITAL ENCOUNTER (OUTPATIENT)
Dept: SURGERY | Age: 58
Discharge: HOME | End: 2023-02-13
Payer: COMMERCIAL

## 2023-02-13 VITALS
BODY MASS INDEX: 25.76 KG/M2 | WEIGHT: 170 LBS | HEART RATE: 82 BPM | SYSTOLIC BLOOD PRESSURE: 131 MMHG | HEIGHT: 68 IN | DIASTOLIC BLOOD PRESSURE: 78 MMHG

## 2023-02-13 PROCEDURE — 99202 OFFICE O/P NEW SF 15 MIN: CPT

## 2023-02-13 PROCEDURE — 99202 OFFICE O/P NEW SF 15 MIN: CPT | Performed by: SURGERY

## 2023-02-13 NOTE — CONSULTS
[facility]  Consult Note            NAME:  Chaz Eubanks  MRN: [id number]   YOB: 1965   Date: 2/13/2023   Age: 65 y.o. Gender: male     Body mass index is 25.85 kg/m². Chief Complaint: Enlarging left upper extremity subcutaneous mass    History of Present Illness:  Mr. Dodie Castellano is a very pleasant 60-year-old gentleman who presents to the office today with an enlarging left upper extremity subcutaneous mass. The patient cannot recall anything specific that he was doing at the onset of his symptoms but has noticed gradual increasing bulging and discomfort associated with his left upper inner arm. The patient denies any overlying skin changes. He denies any open or draining wounds. The patient denies any similar masses elsewhere on his body. The patient states that his discomfort is a localized dull and aching sensation. He denies any issues with lymphedema. He has not had any swollen glands. The patient denies any imaging of the area in question. He states that he has not required anything in terms of medication for management of his discomfort. The patient denies any history of recurrent soft tissue infections or MRSA. He denies any issues with excessive bleeding or bruising. He denies any problems with anesthetics. Given this enlarging left upper extremity subcutaneous mass, a surgical evaluation was requested for assistance with management.     Current Outpatient Medications on File Prior to Encounter   Medication Sig Dispense Refill    rosuvastatin (CRESTOR) 5 MG tablet Take 1 tablet by mouth nightly 90 tablet 0    metFORMIN (GLUCOPHAGE XR) 750 MG extended release tablet Take 1 tablet by mouth daily (with breakfast) 90 tablet 0    vitamin D (ERGOCALCIFEROL) 1.25 MG (86895 UT) CAPS capsule Take 1 capsule by mouth once a week 12 capsule 1    candesartan (ATACAND) 16 MG tablet Take 1 tablet by mouth daily 90 tablet 0     No current facility-administered medications on file prior to encounter.        No Known Allergies    Past Medical History:   Diagnosis Date    Erectile dysfunction        Past Surgical History:   Procedure Laterality Date    ARM SURGERY      left forearm       Family History   Problem Relation Age of Onset    Diabetes Father     Heart Disease Father     Coronary Art Dis Father     Cancer Mother         Social History     Socioeconomic History    Marital status:      Spouse name: None    Number of children: None    Years of education: None    Highest education level: None   Tobacco Use    Smoking status: Every Day     Packs/day: 1.00     Years: 40.00     Pack years: 40.00     Types: Cigarettes    Smokeless tobacco: Never   Vaping Use    Vaping Use: Former   Substance and Sexual Activity    Alcohol use: No    Drug use: Yes     Types: Marijuana ([employer] Seller)     Comment: last use 01/19/2023    Sexual activity: Yes     Partners: Female     Social Determinants of Health     Financial Resource Strain: High Risk    Difficulty of Paying Living Expenses: Hard   Food Insecurity: Food Insecurity Present    Worried About Running Out of Food in the Last Year: Never true    Ran Out of Food in the Last Year: Sometimes true   Transportation Needs: Unknown    Lack of Transportation (Non-Medical): No   Housing Stability: Unknown    Unstable Housing in the Last Year: No       Review of Systems - History obtained from chart review and the patient  General ROS: negative for - chills or fever  Psychological ROS: negative for - anxiety or depression  Ophthalmic ROS: negative for - blurry vision, dry eyes, or eye pain  ENT ROS: negative for - epistaxis or oral lesions  Allergy and Immunology ROS: negative for - hives or itchy/watery eyes  Hematological and Lymphatic ROS: negative for - bleeding problems, blood clots, blood transfusions, bruising, or jaundice  Endocrine ROS: negative for - palpitations, polydipsia/polyuria, skin changes, temperature intolerance, or unexpected weight changes  Respiratory ROS: no cough, shortness of breath, or wheezing  Cardiovascular ROS: no chest pain or dyspnea on exertion  Gastrointestinal ROS: no abdominal pain, change in bowel habits, or black or bloody stools  Genito-Urinary ROS: no dysuria, trouble voiding, or hematuria  Musculoskeletal ROS: positive for - joint pain and joint stiffness  Neurological ROS: no TIA or stroke symptoms  Dermatological ROS: negative for - pruritus, rash, or skin lesion changes    Vitals:    02/13/23 1430   BP: 131/78   Pulse: 82        No intake/output data recorded.     General Appearance: alert and oriented to person, place and time, well-developed and well-nourished, in no acute distress  Skin: warm and dry, no rash or erythema  Head: normocephalic and atraumatic  Eyes: pupils equal, round, and reactive to light, extraocular eye movements intact, conjunctivae normal  ENT: hearing grossly normal bilaterally  Neck: neck supple and non tender without mass, no thyromegaly or thyroid nodules, no cervical lymphadenopathy   Pulmonary/Chest: clear to auscultation bilaterally- no wheezes, rales or rhonchi, normal air movement, no respiratory distress  Cardiovascular: normal rate, normal S1 and S2, no gallops, intact distal pulses, and no carotid bruits  Abdomen: soft, non-tender, non-distended, normal bowel sounds, no masses or organomegaly  Breast: breasts appear normal, no suspicious masses, no skin or nipple changes or axillary nodes  Genitourinary: genitals normal without hernia or inguinal adenopathy  Extremities: no cyanosis and no clubbing  Musculoskeletal: normal range of motion, no joint swelling, deformity or tenderness and well-circumscribed left upper extremity subcutaneous mass on his inner upper arm measuring approximately 2 cm in greatest dimensions, no overlying skin changes, mild localized tenderness with palpation  Neurologic: gait and coordination normal and speech normal    Hemoglobin   Date Value Ref Range Status 01/21/2023 16.2 13.0 - 17.0 g/dL Final     Hematocrit   Date Value Ref Range Status   01/21/2023 47.5 40.7 - 50.3 % Final     WBC   Date Value Ref Range Status   01/21/2023 11.2 3.5 - 11.3 k/uL Final     Sodium   Date Value Ref Range Status   01/21/2023 143 135 - 144 mmol/L Final     Potassium   Date Value Ref Range Status   01/21/2023 4.1 3.7 - 5.3 mmol/L Final     Chloride   Date Value Ref Range Status   01/21/2023 107 98 - 107 mmol/L Final     CO2   Date Value Ref Range Status   01/21/2023 23 20 - 31 mmol/L Final     BUN   Date Value Ref Range Status   01/21/2023 17 6 - 20 mg/dL Final     Glucose   Date Value Ref Range Status   01/21/2023 144 (H) 70 - 99 mg/dL Final          Assessment: Left upper extremity subcutaneous mass    Plan: At this point, I discussed with the patient my recommendations for proceeding with excision of this left upper extremity subcutaneous mass. I discussed with the patient the indications for excision of this mass including localized discomfort and enlargement. I discussed with the patient the risks and benefits of excision of this mass. This includes the risks of bleeding, infection, recurrence, wound healing problems, acute postoperative pain, chronic pain, and scarring. Certainly anything excised will be sent for permanent pathologic evaluation to rule out anything unexpected. The patient verbalized understanding. We will get this scheduled for him at his earliest convenience. I spent 28 minutes with the patient via records, forming history and physical, and developing an assessment and plan.     Electronically signed by Tania Vazquez MD on 2/13/2023 at 5:06 PM

## 2023-03-03 ENCOUNTER — OFFICE VISIT (OUTPATIENT)
Dept: FAMILY MEDICINE CLINIC | Age: 58
End: 2023-03-03
Payer: COMMERCIAL

## 2023-03-03 VITALS
TEMPERATURE: 97 F | BODY MASS INDEX: 25.85 KG/M2 | HEART RATE: 75 BPM | HEIGHT: 68 IN | SYSTOLIC BLOOD PRESSURE: 128 MMHG | WEIGHT: 170.6 LBS | OXYGEN SATURATION: 97 % | DIASTOLIC BLOOD PRESSURE: 81 MMHG

## 2023-03-03 DIAGNOSIS — I10 ESSENTIAL HYPERTENSION: Primary | ICD-10-CM

## 2023-03-03 DIAGNOSIS — N40.0 BENIGN PROSTATIC HYPERPLASIA, UNSPECIFIED WHETHER LOWER URINARY TRACT SYMPTOMS PRESENT: ICD-10-CM

## 2023-03-03 DIAGNOSIS — Z72.0 TOBACCO ABUSE DISORDER: ICD-10-CM

## 2023-03-03 DIAGNOSIS — E11.69 TYPE 2 DIABETES MELLITUS WITH OTHER SPECIFIED COMPLICATION, WITHOUT LONG-TERM CURRENT USE OF INSULIN (HCC): ICD-10-CM

## 2023-03-03 DIAGNOSIS — E55.9 VITAMIN D DEFICIENCY: ICD-10-CM

## 2023-03-03 DIAGNOSIS — E78.5 DYSLIPIDEMIA: ICD-10-CM

## 2023-03-03 DIAGNOSIS — Z71.6 TOBACCO ABUSE COUNSELING: ICD-10-CM

## 2023-03-03 PROCEDURE — 3074F SYST BP LT 130 MM HG: CPT | Performed by: FAMILY MEDICINE

## 2023-03-03 PROCEDURE — 99213 OFFICE O/P EST LOW 20 MIN: CPT | Performed by: FAMILY MEDICINE

## 2023-03-03 PROCEDURE — 3079F DIAST BP 80-89 MM HG: CPT | Performed by: FAMILY MEDICINE

## 2023-03-03 PROCEDURE — 3044F HG A1C LEVEL LT 7.0%: CPT | Performed by: FAMILY MEDICINE

## 2023-03-03 RX ORDER — ERGOCALCIFEROL 1.25 MG/1
50000 CAPSULE ORAL WEEKLY
Qty: 12 CAPSULE | Refills: 0 | Status: SHIPPED | OUTPATIENT
Start: 2023-03-03

## 2023-03-03 SDOH — ECONOMIC STABILITY: FOOD INSECURITY: WITHIN THE PAST 12 MONTHS, YOU WORRIED THAT YOUR FOOD WOULD RUN OUT BEFORE YOU GOT MONEY TO BUY MORE.: OFTEN TRUE

## 2023-03-03 SDOH — ECONOMIC STABILITY: INCOME INSECURITY: HOW HARD IS IT FOR YOU TO PAY FOR THE VERY BASICS LIKE FOOD, HOUSING, MEDICAL CARE, AND HEATING?: HARD

## 2023-03-03 SDOH — ECONOMIC STABILITY: FOOD INSECURITY: WITHIN THE PAST 12 MONTHS, THE FOOD YOU BOUGHT JUST DIDN'T LAST AND YOU DIDN'T HAVE MONEY TO GET MORE.: SOMETIMES TRUE

## 2023-03-03 ASSESSMENT — PATIENT HEALTH QUESTIONNAIRE - PHQ9
SUM OF ALL RESPONSES TO PHQ QUESTIONS 1-9: 0
2. FEELING DOWN, DEPRESSED OR HOPELESS: 0
SUM OF ALL RESPONSES TO PHQ QUESTIONS 1-9: 0
SUM OF ALL RESPONSES TO PHQ QUESTIONS 1-9: 0
SUM OF ALL RESPONSES TO PHQ9 QUESTIONS 1 & 2: 0
SUM OF ALL RESPONSES TO PHQ QUESTIONS 1-9: 0
SUM OF ALL RESPONSES TO PHQ QUESTIONS 1-9: 0
2. FEELING DOWN, DEPRESSED OR HOPELESS: 0
SUM OF ALL RESPONSES TO PHQ QUESTIONS 1-9: 0
1. LITTLE INTEREST OR PLEASURE IN DOING THINGS: 0
1. LITTLE INTEREST OR PLEASURE IN DOING THINGS: 0
SUM OF ALL RESPONSES TO PHQ9 QUESTIONS 1 & 2: 0

## 2023-03-03 ASSESSMENT — ENCOUNTER SYMPTOMS
COUGH: 0
CONSTIPATION: 0
GASTROINTESTINAL NEGATIVE: 1
SINUS PRESSURE: 0
EYE REDNESS: 0
EYE DISCHARGE: 0
NAUSEA: 0
EYES NEGATIVE: 1
SHORTNESS OF BREATH: 0
SORE THROAT: 0
DIARRHEA: 0
ALLERGIC/IMMUNOLOGIC NEGATIVE: 1
PHOTOPHOBIA: 0
STRIDOR: 0
VOMITING: 0
VOICE CHANGE: 0
ABDOMINAL PAIN: 0
COLOR CHANGE: 0
EYE PAIN: 0
RESPIRATORY NEGATIVE: 1
BACK PAIN: 0
WHEEZING: 0
CHEST TIGHTNESS: 0
BLOOD IN STOOL: 0
RHINORRHEA: 0
ABDOMINAL DISTENTION: 0

## 2023-03-03 ASSESSMENT — ANXIETY QUESTIONNAIRES
GAD7 TOTAL SCORE: 0
1. FEELING NERVOUS, ANXIOUS, OR ON EDGE: 0
2. NOT BEING ABLE TO STOP OR CONTROL WORRYING: 0
6. BECOMING EASILY ANNOYED OR IRRITABLE: 0
5. BEING SO RESTLESS THAT IT IS HARD TO SIT STILL: 0
4. TROUBLE RELAXING: 0
7. FEELING AFRAID AS IF SOMETHING AWFUL MIGHT HAPPEN: 0
3. WORRYING TOO MUCH ABOUT DIFFERENT THINGS: 0
IF YOU CHECKED OFF ANY PROBLEMS ON THIS QUESTIONNAIRE, HOW DIFFICULT HAVE THESE PROBLEMS MADE IT FOR YOU TO DO YOUR WORK, TAKE CARE OF THINGS AT HOME, OR GET ALONG WITH OTHER PEOPLE: NOT DIFFICULT AT ALL

## 2023-03-03 NOTE — PROGRESS NOTES
Subjective:      Patient ID: Roger Thakkar is a 65 y.o. male. States works as a  and very active. Has been taking all meds as ordered - states tolerating this well. Mood, sleep, appetite, bowels and bladder ok. Has labs ordered for 2 months from now. Bowels and bladder ok as well. Review of Systems   Constitutional: Negative. Negative for activity change, appetite change, chills, fatigue and fever. HENT: Negative. Negative for congestion, dental problem, ear pain, hearing loss, nosebleeds, rhinorrhea, sinus pressure, sneezing, sore throat, tinnitus and voice change. Eyes: Negative. Negative for photophobia, pain, discharge, redness and visual disturbance. Respiratory: Negative. Negative for cough, chest tightness, shortness of breath, wheezing and stridor. Cardiovascular: Negative. Negative for chest pain, palpitations and leg swelling. Gastrointestinal: Negative. Negative for abdominal distention, abdominal pain, blood in stool, constipation, diarrhea, nausea and vomiting. Endocrine: Negative. Negative for cold intolerance, heat intolerance and polydipsia. Genitourinary: Negative. Negative for decreased urine volume, difficulty urinating, dysuria, flank pain, frequency, hematuria and urgency. Musculoskeletal: Negative. Negative for arthralgias, back pain, gait problem, joint swelling, myalgias and neck pain. Skin: Negative. Negative for color change, pallor and rash. Allergic/Immunologic: Negative. Negative for environmental allergies and food allergies. Neurological: Negative. Negative for dizziness, tremors, seizures, weakness and headaches. Hematological: Negative. Negative for adenopathy. Does not bruise/bleed easily. Psychiatric/Behavioral: Negative. Negative for agitation, behavioral problems, hallucinations, sleep disturbance and suicidal ideas. The patient is not nervous/anxious.       Objective:   Physical Exam  Constitutional:       General: He is not in acute distress. Appearance: He is well-developed. He is not diaphoretic. HENT:      Head: Normocephalic and atraumatic. Right Ear: External ear normal.      Left Ear: External ear normal.      Nose: Nose normal.      Mouth/Throat:      Mouth: Mucous membranes are moist.      Pharynx: No oropharyngeal exudate. Eyes:      General: No scleral icterus. Right eye: No discharge. Left eye: No discharge. Conjunctiva/sclera: Conjunctivae normal.      Pupils: Pupils are equal, round, and reactive to light. Neck:      Thyroid: No thyromegaly. Vascular: No JVD. Trachea: No tracheal deviation. Cardiovascular:      Rate and Rhythm: Normal rate and regular rhythm. Pulses: Normal pulses. Heart sounds: Normal heart sounds. No murmur heard. No friction rub. No gallop. Pulmonary:      Effort: Pulmonary effort is normal. No respiratory distress. Breath sounds: Normal breath sounds. No wheezing or rales. Chest:      Chest wall: No tenderness. Abdominal:      General: Bowel sounds are normal. There is no distension. Palpations: Abdomen is soft. There is no mass. Tenderness: There is no abdominal tenderness. There is no guarding or rebound. Musculoskeletal:         General: No tenderness or deformity. Normal range of motion. Cervical back: Normal range of motion and neck supple. Lymphadenopathy:      Cervical: No cervical adenopathy. Skin:     General: Skin is warm and dry. Coloration: Skin is not pale. Findings: No erythema or rash. Neurological:      General: No focal deficit present. Mental Status: He is alert and oriented to person, place, and time. Mental status is at baseline. Cranial Nerves: No cranial nerve deficit. Motor: No abnormal muscle tone. Coordination: Coordination normal.      Deep Tendon Reflexes: Reflexes are normal and symmetric.  Reflexes normal.   Psychiatric:         Mood and Affect: Mood normal.         Behavior: Behavior normal.         Thought Content: Thought content normal.         Judgment: Judgment normal.        Vitals:    03/03/23 0912   BP: 128/81   Pulse: 75   Temp: 97 °F (36.1 °C)   SpO2: 97%         Assessment:      Diagnosis Orders   1. Essential hypertension        2. Tobacco abuse disorder        3. Tobacco abuse counseling        4. Vitamin D deficiency  vitamin D (ERGOCALCIFEROL) 1.25 MG (75537 UT) CAPS capsule      5. Type 2 diabetes mellitus with other specified complication, without long-term current use of insulin ([facility] [address]        6. Dyslipidemia        7. Benign prostatic hyperplasia, unspecified whether lower urinary tract symptoms present              Plan:   No orders of the defined types were placed in this encounter. Outpatient Encounter Medications as of 3/3/2023   Medication Sig Dispense Refill    vitamin D (ERGOCALCIFEROL) 1.25 MG (44126 UT) CAPS capsule Take 1 capsule by mouth once a week 12 capsule 0    rosuvastatin (CRESTOR) 5 MG tablet Take 1 tablet by mouth nightly 90 tablet 0    metFORMIN (GLUCOPHAGE XR) 750 MG extended release tablet Take 1 tablet by mouth daily (with breakfast) 90 tablet 0    candesartan (ATACAND) 16 MG tablet Take 1 tablet by mouth daily 90 tablet 0    [DISCONTINUED] vitamin D (ERGOCALCIFEROL) 1.25 MG (61064 UT) CAPS capsule Take 1 capsule by mouth once a week 12 capsule 1     No facility-administered encounter medications on file as of 3/3/2023.      20 minutes spent with patient counseling/educating on acute/chronic medical health problems, medications,  along with treatment options. Reviewed multiple labs/imaging/medications with patient during this time. Following Diet discussion/education was done on Dash Diet, Diabetic Diet, and Cholesterol Diet . In addition Exercise plan and depression screen were discussed. Recent labs/imaging were discussed and reviewed with patient.

## 2023-03-07 ENCOUNTER — HOSPITAL ENCOUNTER (OUTPATIENT)
Age: 58
Setting detail: OUTPATIENT SURGERY
Discharge: HOME | End: 2023-03-07
Attending: SURGERY | Admitting: SURGERY
Payer: COMMERCIAL

## 2023-03-07 VITALS
HEART RATE: 77 BPM | SYSTOLIC BLOOD PRESSURE: 140 MMHG | RESPIRATION RATE: 17 BRPM | TEMPERATURE: 97.3 F | HEIGHT: 68 IN | DIASTOLIC BLOOD PRESSURE: 85 MMHG | OXYGEN SATURATION: 96 % | WEIGHT: 165 LBS | BODY MASS INDEX: 25.01 KG/M2

## 2023-03-07 DIAGNOSIS — R22.32 ARM MASS, LEFT: ICD-10-CM

## 2023-03-07 LAB — GLUCOSE BLD-MCNC: 130 MG/DL (ref 75–110)

## 2023-03-07 PROCEDURE — 2500000003 HC RX 250 WO HCPCS: Performed by: SURGERY

## 2023-03-07 PROCEDURE — 7100000010 HC PHASE II RECOVERY - FIRST 15 MIN: Performed by: SURGERY

## 2023-03-07 PROCEDURE — 82947 ASSAY GLUCOSE BLOOD QUANT: CPT

## 2023-03-07 PROCEDURE — 99152 MOD SED SAME PHYS/QHP 5/>YRS: CPT | Performed by: SURGERY

## 2023-03-07 PROCEDURE — 3600000012 HC SURGERY LEVEL 2 ADDTL 15MIN: Performed by: SURGERY

## 2023-03-07 PROCEDURE — 6360000002 HC RX W HCPCS: Performed by: SURGERY

## 2023-03-07 PROCEDURE — 3600000002 HC SURGERY LEVEL 2 BASE: Performed by: SURGERY

## 2023-03-07 PROCEDURE — 2709999900 HC NON-CHARGEABLE SUPPLY: Performed by: SURGERY

## 2023-03-07 PROCEDURE — 24075 EXC ARM/ELBOW LES SC < 3 CM: CPT | Performed by: SURGERY

## 2023-03-07 PROCEDURE — 7100000011 HC PHASE II RECOVERY - ADDTL 15 MIN: Performed by: SURGERY

## 2023-03-07 PROCEDURE — 88304 TISSUE EXAM BY PATHOLOGIST: CPT

## 2023-03-07 PROCEDURE — 99153 MOD SED SAME PHYS/QHP EA: CPT | Performed by: SURGERY

## 2023-03-07 RX ORDER — FENTANYL CITRATE 50 UG/ML
INJECTION, SOLUTION INTRAMUSCULAR; INTRAVENOUS PRN
Status: DISCONTINUED | OUTPATIENT
Start: 2023-03-07 | End: 2023-03-07 | Stop reason: ALTCHOICE

## 2023-03-07 RX ORDER — SODIUM CHLORIDE, SODIUM LACTATE, POTASSIUM CHLORIDE, CALCIUM CHLORIDE 600; 310; 30; 20 MG/100ML; MG/100ML; MG/100ML; MG/100ML
INJECTION, SOLUTION INTRAVENOUS CONTINUOUS
Status: DISCONTINUED | OUTPATIENT
Start: 2023-03-07 | End: 2023-03-07 | Stop reason: HOSPADM

## 2023-03-07 RX ORDER — MIDAZOLAM HYDROCHLORIDE 1 MG/ML
INJECTION INTRAMUSCULAR; INTRAVENOUS PRN
Status: DISCONTINUED | OUTPATIENT
Start: 2023-03-07 | End: 2023-03-07 | Stop reason: ALTCHOICE

## 2023-03-07 ASSESSMENT — PAIN - FUNCTIONAL ASSESSMENT: PAIN_FUNCTIONAL_ASSESSMENT: 0-10

## 2023-03-07 ASSESSMENT — PAIN SCALES - GENERAL: PAINLEVEL_OUTOF10: 0

## 2023-03-07 NOTE — OP NOTE
Operative Note      Patient: Shirin Hummel  YOB: 1965  MRN: [id number]    Date of Procedure: 3/7/2023    Pre-Op Diagnosis:  Left upper arm subcutaneous mass    Post-Op Diagnosis: Same       Procedure(s):  EXCISION  LEFT UPPER ARM MASS, 2.3 cm    Surgeon(s):  Niurka Burnett MD    Assistant:   Resident: Pily Rawls MD; Joanne Olson DO    Anesthesia: IV Sedation    Estimated Blood Loss (mL): Minimal    Complications: None    Specimens:   ID Type Source Tests Collected by Time Destination   A : LEFT UPPER ARM MASS Tissue Arm SURGICAL PATHOLOGY Niurka Burnett MD 3/7/2023 0820        Implants:  * No implants in log *      Drains: * No LDAs found *    Findings: Left upper arm subcutaneous mass    Indications for procedure: This is a very pleasant 15-year-old gentleman with a history of an enlarging left upper arm subcutaneous mass. The risks and benefits of excision of this left upper arm subcutaneous mass were discussed with the patient and verbal and written consent was obtained. Detailed Description of Procedure:   After verbal and written consent, the patient was brought to the operating room. After appropriate monitoring, intravenous sedation was administered. A timeout was performed with the staff in the room confirming both the patient and the procedure to be performed. The procedure was begun with a sterile prep and drape. Local anesthesia was infiltrated into the skin and subcutaneous tissue was a field block. A longitudinal oriented incision was made over the palpable mass in question. Dissection was carried down through the subcutaneous tissue with a combination of sharp dissection and cautery. The mass in question was excised with grossly negative margins. This extended down to but did not involve the underlying muscular fascia. The mass measured 2.3 cm in greatest dimensions. Once excised, the specimen was placed in formalin and sent for permanent pathologic evaluation.   The incision was then closed in layers with interrupted Vicryl suture used to close the deep dermal tissue and a running Monocryl suture used to close the skin. Dermabond was applied to the wound. A sterile dressing was applied. The sponge, lap, needle, and instrument count were correct at the end of the case. The patient was awakened from anesthesia and transported to the recovery in stable condition. There were no immediate complications.     Electronically signed by Tripp Cerna MD on 3/7/2023 at 8:22 AM

## 2023-03-07 NOTE — DISCHARGE INSTRUCTIONS
Patient Discharge Instructions  Discharge Date:  3/7/2023    Discharged To:   Home    Home with Home Health Care: No    RESUME ACTIVITY:      BATHING: May shower in24 hours, leave Dermabond on, wash gently with soap and water, no creams/lotions/ointments over Dermabond, dry dressing as needed    DRIVING: No driving on narcotics    WALKING:  Yes    STAIRS:  Yes    LIFTING: No excessive strenuous activity for 5-7 days    DIET: common adult    SPECIAL INSTRUCTIONS:     May use ice pack for pain/swelling    May use Motrin or Aleve for breakthrough pain    May take Milk of Magnesia as needed for constipation      Call for follow up appointment with Dr. Nohemi Morton in:  7-14 days in the [address] [place] [address]

## 2023-03-07 NOTE — H&P
Interval H&P Note    Pt Name: Kendall Oconnor  MRN: [id number]  YOB: 1965  Date of evaluation: 3/7/2023      [x] I have reviewed in [facility] the general surgery consult note by Dr. Christian Huang dated 02/13/2023, attached below for an Interval History and Physical note. [x] I have examined  Sen Arce  There are no changes to the patient who is scheduled for EXCISION  LEFT UPPER ARM MASS by Nicolas Murphy MD for [facility]. The patient denies new health changes, fever, chills, wheezing, cough, increased SOB, chest pain, open sores or wounds. Hx diabetes POC . Last Vitamin D more than a week ago. Vital signs: /72   Pulse 78   Temp 97.7 °F (36.5 °C) (Temporal)   Resp 16   Ht 5' 8\" (1.727 m)   Wt 165 lb (74.8 kg)   SpO2 97%   BMI 25.09 kg/m²     Allergies:  Patient has no known allergies. Medications:    Prior to Admission medications    Medication Sig Start Date End Date Taking? Authorizing Provider   vitamin D (ERGOCALCIFEROL) 1.25 MG (17920 UT) CAPS capsule Take 1 capsule by mouth once a week 3/3/23   Cha Tuttle MD   rosuvastatin (CRESTOR) 5 MG tablet Take 1 tablet by mouth nightly 2/3/23   Cha Tuttle MD   metFORMIN (GLUCOPHAGE XR) 750 MG extended release tablet Take 1 tablet by mouth daily (with breakfast) 2/3/23   Cha Tuttle MD   candesartan (ATACAND) 16 MG tablet Take 1 tablet by mouth daily 1/20/23 1/20/24  Cha Tuttle MD         This is a 65 y.o. male who is pleasant, cooperative, alert and oriented x3, in no acute distress. Heart: Heart sounds are normal.  HR 78 regular rate and rhythm without murmur, gallop or rub. Lungs: Normal respiratory effort with equal expansion, good air exchange, unlabored and clear to auscultation without wheezes or rales bilaterally   Abdomen: soft, nontender, nondistended with bowel sounds active. Extremities: Left upper arm mass noted. No open or draining areas noted. Radial and pedal pulses palpable bilaterally.  No pedal edema or calf tenderness bilaterally. Palmar grasps 5/5 bilaterally. Labs:  No results for input(s): HGB, HCT, WBC, MCV, PLT, NA, K, CL, CO2, BUN, CREATININE, GLUCOSE, INR, PROTIME, APTT, AST, ALT, LABALBU, HCG in the last 720 hours. No results for input(s): COVID19 in the last 720 hours. TIFFANIE Crowe - CNP  Electronically signed 3/7/2023 at 7:38 AM       Holly Navarro MD   Physician   General Surgery   Consults       Signed   Date of Service:  2/13/2023  2:30 PM          Related encounter: Preop from 2/13/2023 in [facility] and [address] [place] [address] General Surgery Clinic  Consult Note                 NAME:  Dutch Higgins  MRN: [id number]   YOB: 1965   Date: 2/13/2023   Age: 65 y.o. Gender: male      Body mass index is 25.85 kg/m². Chief Complaint: Enlarging left upper extremity subcutaneous mass     History of Present Illness:  Mr. Erling Phoenix is a very pleasant 78-year-old gentleman who presents to the office today with an enlarging left upper extremity subcutaneous mass. The patient cannot recall anything specific that he was doing at the onset of his symptoms but has noticed gradual increasing bulging and discomfort associated with his left upper inner arm. The patient denies any overlying skin changes. He denies any open or draining wounds. The patient denies any similar masses elsewhere on his body. The patient states that his discomfort is a localized dull and aching sensation. He denies any issues with lymphedema. He has not had any swollen glands. The patient denies any imaging of the area in question. He states that he has not required anything in terms of medication for management of his discomfort. The patient denies any history of recurrent soft tissue infections or MRSA. He denies any issues with excessive bleeding or bruising. He denies any problems with anesthetics.   Given this enlarging left upper extremity subcutaneous mass, a surgical evaluation was requested for assistance with management. Current Outpatient Medications on File Prior to Encounter   Medication Sig Dispense Refill    rosuvastatin (CRESTOR) 5 MG tablet Take 1 tablet by mouth nightly 90 tablet 0    metFORMIN (GLUCOPHAGE XR) 750 MG extended release tablet Take 1 tablet by mouth daily (with breakfast) 90 tablet 0    vitamin D (ERGOCALCIFEROL) 1.25 MG (10067 UT) CAPS capsule Take 1 capsule by mouth once a week 12 capsule 1    candesartan (ATACAND) 16 MG tablet Take 1 tablet by mouth daily 90 tablet 0      No current facility-administered medications on file prior to encounter.          No Known Allergies     Past Medical History        Past Medical History:   Diagnosis Date    Erectile dysfunction              Past Surgical History         Past Surgical History:   Procedure Laterality Date    ARM SURGERY         left forearm            Family History         Family History   Problem Relation Age of Onset    Diabetes Father      Heart Disease Father      Coronary Art Dis Father      Cancer Mother              Social History   Social History            Socioeconomic History    Marital status:        Spouse name: None    Number of children: None    Years of education: None    Highest education level: None   Tobacco Use    Smoking status: Every Day       Packs/day: 1.00       Years: 40.00       Pack years: 40.00       Types: Cigarettes    Smokeless tobacco: Never   Vaping Use    Vaping Use: Former   Substance and Sexual Activity    Alcohol use: No    Drug use: Yes       Types: Marijuana (Weed)       Comment: last use 01/19/2023    Sexual activity: Yes       Partners: Female      Social Determinants of Health          Financial Resource Strain: High Risk    Difficulty of Paying Living Expenses: Hard   Food Insecurity: Food Insecurity Present    Worried About Running Out of Food in the Last Year: Never true    Ran Out of Food in the Last Year: Sometimes true Transportation Needs: Unknown    Lack of Transportation (Non-Medical): No   Housing Stability: Unknown    Unstable Housing in the Last Year: No            Review of Systems - History obtained from chart review and the patient  General ROS: negative for - chills or fever  Psychological ROS: negative for - anxiety or depression  Ophthalmic ROS: negative for - blurry vision, dry eyes, or eye pain  ENT ROS: negative for - epistaxis or oral lesions  Allergy and Immunology ROS: negative for - hives or itchy/watery eyes  Hematological and Lymphatic ROS: negative for - bleeding problems, blood clots, blood transfusions, bruising, or jaundice  Endocrine ROS: negative for - palpitations, polydipsia/polyuria, skin changes, temperature intolerance, or unexpected weight changes  Respiratory ROS: no cough, shortness of breath, or wheezing  Cardiovascular ROS: no chest pain or dyspnea on exertion  Gastrointestinal ROS: no abdominal pain, change in bowel habits, or black or bloody stools  Genito-Urinary ROS: no dysuria, trouble voiding, or hematuria  Musculoskeletal ROS: positive for - joint pain and joint stiffness  Neurological ROS: no TIA or stroke symptoms  Dermatological ROS: negative for - pruritus, rash, or skin lesion changes         Vitals:     02/13/23 1430   BP: 131/78   Pulse: 82         No intake/output data recorded.      General Appearance: alert and oriented to person, place and time, well-developed and well-nourished, in no acute distress  Skin: warm and dry, no rash or erythema  Head: normocephalic and atraumatic  Eyes: pupils equal, round, and reactive to light, extraocular eye movements intact, conjunctivae normal  ENT: hearing grossly normal bilaterally  Neck: neck supple and non tender without mass, no thyromegaly or thyroid nodules, no cervical lymphadenopathy   Pulmonary/Chest: clear to auscultation bilaterally- no wheezes, rales or rhonchi, normal air movement, no respiratory distress  Cardiovascular: normal rate, normal S1 and S2, no gallops, intact distal pulses, and no carotid bruits  Abdomen: soft, non-tender, non-distended, normal bowel sounds, no masses or organomegaly  Breast: breasts appear normal, no suspicious masses, no skin or nipple changes or axillary nodes  Genitourinary: genitals normal without hernia or inguinal adenopathy  Extremities: no cyanosis and no clubbing  Musculoskeletal: normal range of motion, no joint swelling, deformity or tenderness and well-circumscribed left upper extremity subcutaneous mass on his inner upper arm measuring approximately 2 cm in greatest dimensions, no overlying skin changes, mild localized tenderness with palpation  Neurologic: gait and coordination normal and speech normal           Hemoglobin   Date Value Ref Range Status   01/21/2023 16.2 13.0 - 17.0 g/dL Final            Hematocrit   Date Value Ref Range Status   01/21/2023 47.5 40.7 - 50.3 % Final            WBC   Date Value Ref Range Status   01/21/2023 11.2 3.5 - 11.3 k/uL Final            Sodium   Date Value Ref Range Status   01/21/2023 143 135 - 144 mmol/L Final            Potassium   Date Value Ref Range Status   01/21/2023 4.1 3.7 - 5.3 mmol/L Final            Chloride   Date Value Ref Range Status   01/21/2023 107 98 - 107 mmol/L Final            CO2   Date Value Ref Range Status   01/21/2023 23 20 - 31 mmol/L Final            BUN   Date Value Ref Range Status   01/21/2023 17 6 - 20 mg/dL Final            Glucose   Date Value Ref Range Status   01/21/2023 144 (H) 70 - 99 mg/dL Final            Assessment: Left upper extremity subcutaneous mass     Plan: At this point, I discussed with the patient my recommendations for proceeding with excision of this left upper extremity subcutaneous mass. I discussed with the patient the indications for excision of this mass including localized discomfort and enlargement. I discussed with the patient the risks and benefits of excision of this mass.   This includes the risks of bleeding, infection, recurrence, wound healing problems, acute postoperative pain, chronic pain, and scarring. Certainly anything excised will be sent for permanent pathologic evaluation to rule out anything unexpected. The patient verbalized understanding. We will get this scheduled for him at his earliest convenience. I spent 28 minutes with the patient via records, forming history and physical, and developing an assessment and plan.      Electronically signed by Lor Cano MD on 2/13/2023 at 5:06 PM

## 2023-03-08 LAB — SURGICAL PATHOLOGY REPORT: NORMAL

## 2023-04-01 DIAGNOSIS — I10 ESSENTIAL HYPERTENSION: ICD-10-CM

## 2023-04-03 RX ORDER — CANDESARTAN 16 MG/1
TABLET ORAL
Qty: 90 TABLET | Refills: 0 | Status: SHIPPED | OUTPATIENT
Start: 2023-04-03

## 2023-05-03 ENCOUNTER — OFFICE VISIT (OUTPATIENT)
Dept: FAMILY MEDICINE CLINIC | Age: 58
End: 2023-05-03
Payer: COMMERCIAL

## 2023-05-03 VITALS
DIASTOLIC BLOOD PRESSURE: 89 MMHG | SYSTOLIC BLOOD PRESSURE: 139 MMHG | TEMPERATURE: 97.3 F | BODY MASS INDEX: 25.46 KG/M2 | HEIGHT: 68 IN | WEIGHT: 168 LBS | HEART RATE: 86 BPM | OXYGEN SATURATION: 98 %

## 2023-05-03 DIAGNOSIS — E11.69 TYPE 2 DIABETES MELLITUS WITH OTHER SPECIFIED COMPLICATION, WITHOUT LONG-TERM CURRENT USE OF INSULIN (HCC): Primary | ICD-10-CM

## 2023-05-03 DIAGNOSIS — I10 ESSENTIAL HYPERTENSION: ICD-10-CM

## 2023-05-03 DIAGNOSIS — Z71.6 TOBACCO ABUSE COUNSELING: ICD-10-CM

## 2023-05-03 DIAGNOSIS — Z72.0 TOBACCO ABUSE DISORDER: ICD-10-CM

## 2023-05-03 DIAGNOSIS — E55.9 VITAMIN D DEFICIENCY: ICD-10-CM

## 2023-05-03 DIAGNOSIS — N40.0 BENIGN PROSTATIC HYPERPLASIA, UNSPECIFIED WHETHER LOWER URINARY TRACT SYMPTOMS PRESENT: ICD-10-CM

## 2023-05-03 DIAGNOSIS — E78.5 DYSLIPIDEMIA: ICD-10-CM

## 2023-05-03 PROCEDURE — 99214 OFFICE O/P EST MOD 30 MIN: CPT | Performed by: FAMILY MEDICINE

## 2023-05-03 PROCEDURE — 3044F HG A1C LEVEL LT 7.0%: CPT | Performed by: FAMILY MEDICINE

## 2023-05-03 PROCEDURE — 3079F DIAST BP 80-89 MM HG: CPT | Performed by: FAMILY MEDICINE

## 2023-05-03 PROCEDURE — 3075F SYST BP GE 130 - 139MM HG: CPT | Performed by: FAMILY MEDICINE

## 2023-05-03 ASSESSMENT — ENCOUNTER SYMPTOMS
SORE THROAT: 0
SHORTNESS OF BREATH: 0
CONSTIPATION: 0
NAUSEA: 0
BACK PAIN: 0
PHOTOPHOBIA: 0
BLOOD IN STOOL: 0
EYE REDNESS: 0
EYE DISCHARGE: 0
STRIDOR: 0
COUGH: 0
EYE PAIN: 0
DIARRHEA: 0
ABDOMINAL PAIN: 0
VOMITING: 0

## 2023-05-03 ASSESSMENT — ANXIETY QUESTIONNAIRES
2. NOT BEING ABLE TO STOP OR CONTROL WORRYING: 0
3. WORRYING TOO MUCH ABOUT DIFFERENT THINGS: 0
7. FEELING AFRAID AS IF SOMETHING AWFUL MIGHT HAPPEN: 0
GAD7 TOTAL SCORE: 0
IF YOU CHECKED OFF ANY PROBLEMS ON THIS QUESTIONNAIRE, HOW DIFFICULT HAVE THESE PROBLEMS MADE IT FOR YOU TO DO YOUR WORK, TAKE CARE OF THINGS AT HOME, OR GET ALONG WITH OTHER PEOPLE: NOT DIFFICULT AT ALL
1. FEELING NERVOUS, ANXIOUS, OR ON EDGE: 0
4. TROUBLE RELAXING: 0
6. BECOMING EASILY ANNOYED OR IRRITABLE: 0
5. BEING SO RESTLESS THAT IT IS HARD TO SIT STILL: 0

## 2023-05-03 ASSESSMENT — PATIENT HEALTH QUESTIONNAIRE - PHQ9
SUM OF ALL RESPONSES TO PHQ QUESTIONS 1-9: 0
SUM OF ALL RESPONSES TO PHQ9 QUESTIONS 1 & 2: 0
1. LITTLE INTEREST OR PLEASURE IN DOING THINGS: 0
SUM OF ALL RESPONSES TO PHQ QUESTIONS 1-9: 0
2. FEELING DOWN, DEPRESSED OR HOPELESS: 0

## 2023-05-03 NOTE — PROGRESS NOTES
Subjective:      Patient ID: Helder Morfin is a 65 y.o. male. Sates hard to quit smoking as GF smokes but he has cot down from 1.5 to 1 PPD. States cannot tolerate the vape. Mood, sleep,appetite ok, bowels and bladder are ok as well . States not yet done his Colonoscopy. Labs due tomorrow- will call Monday to discuss. Review of Systems   Constitutional:  Negative for chills and fever. HENT:  Negative for congestion, ear pain, hearing loss, nosebleeds, sore throat and tinnitus. Eyes:  Negative for photophobia, pain, discharge and redness. Respiratory:  Negative for cough, shortness of breath and stridor. Cardiovascular:  Negative for chest pain, palpitations and leg swelling. Gastrointestinal:  Negative for abdominal pain, blood in stool, constipation, diarrhea, nausea and vomiting. Endocrine: Negative for polydipsia. Genitourinary:  Negative for dysuria, flank pain, frequency, hematuria and urgency. Musculoskeletal:  Negative for back pain, myalgias and neck pain. Skin:  Negative for rash. Allergic/Immunologic: Negative for environmental allergies. Neurological:  Negative for dizziness, tremors, seizures, weakness and headaches. Hematological:  Does not bruise/bleed easily. Psychiatric/Behavioral:  Negative for hallucinations and suicidal ideas. The patient is not nervous/anxious. Objective:   Physical Exam  Constitutional:       General: He is not in acute distress. Appearance: He is well-developed. He is not diaphoretic. HENT:      Head: Normocephalic and atraumatic. Right Ear: External ear normal.      Left Ear: External ear normal.      Nose: Nose normal.      Mouth/Throat:      Mouth: Mucous membranes are moist.      Pharynx: No oropharyngeal exudate. Eyes:      General: No scleral icterus. Right eye: No discharge. Left eye: No discharge.       Conjunctiva/sclera: Conjunctivae normal.      Pupils: Pupils are equal, round, and reactive to

## 2023-05-04 ENCOUNTER — HOSPITAL ENCOUNTER (OUTPATIENT)
Age: 58
Discharge: HOME | End: 2023-05-04
Payer: COMMERCIAL

## 2023-05-04 DIAGNOSIS — E11.9 TYPE 2 DIABETES MELLITUS WITHOUT COMPLICATION, WITHOUT LONG-TERM CURRENT USE OF INSULIN (HCC): ICD-10-CM

## 2023-05-04 DIAGNOSIS — E78.5 DYSLIPIDEMIA: ICD-10-CM

## 2023-05-04 LAB
ALBUMIN SERPL-MCNC: 4 G/DL (ref 3.5–5.2)
ALBUMIN/GLOBULIN RATIO: 1.4 (ref 1–2.5)
ALP SERPL-CCNC: 113 U/L (ref 40–129)
ALT SERPL-CCNC: 19 U/L (ref 5–41)
ANION GAP SERPL CALCULATED.3IONS-SCNC: 14 MMOL/L (ref 9–17)
AST SERPL-CCNC: 18 U/L
BILIRUB SERPL-MCNC: 0.5 MG/DL (ref 0.3–1.2)
BUN SERPL-MCNC: 21 MG/DL (ref 6–20)
CALCIUM SERPL-MCNC: 9.5 MG/DL (ref 8.6–10.4)
CHLORIDE SERPL-SCNC: 105 MMOL/L (ref 98–107)
CHOLEST SERPL-MCNC: 198 MG/DL
CHOLESTEROL/HDL RATIO: 4.6
CO2 SERPL-SCNC: 21 MMOL/L (ref 20–31)
CREAT SERPL-MCNC: 0.96 MG/DL (ref 0.7–1.2)
CREATININE URINE: 251.9 MG/DL (ref 39–259)
EST. AVERAGE GLUCOSE BLD GHB EST-MCNC: 143 MG/DL
GFR SERPL CREATININE-BSD FRML MDRD: >60 ML/MIN/1.73M2
GLUCOSE SERPL-MCNC: 150 MG/DL (ref 70–99)
HBA1C MFR BLD: 6.6 % (ref 4–6)
HDLC SERPL-MCNC: 43 MG/DL
LDLC SERPL CALC-MCNC: 137 MG/DL (ref 0–130)
MICROALBUMIN/CREAT 24H UR: <12 MG/L
MICROALBUMIN/CREAT UR-RTO: NORMAL MCG/MG CREAT
POTASSIUM SERPL-SCNC: 4.3 MMOL/L (ref 3.7–5.3)
PROT SERPL-MCNC: 6.8 G/DL (ref 6.4–8.3)
SODIUM SERPL-SCNC: 140 MMOL/L (ref 135–144)
TRIGL SERPL-MCNC: 91 MG/DL

## 2023-05-04 PROCEDURE — 82043 UR ALBUMIN QUANTITATIVE: CPT

## 2023-05-04 PROCEDURE — 83036 HEMOGLOBIN GLYCOSYLATED A1C: CPT

## 2023-05-04 PROCEDURE — 36415 COLL VENOUS BLD VENIPUNCTURE: CPT

## 2023-05-04 PROCEDURE — 82570 ASSAY OF URINE CREATININE: CPT

## 2023-05-04 PROCEDURE — 80061 LIPID PANEL: CPT

## 2023-05-04 PROCEDURE — 80053 COMPREHEN METABOLIC PANEL: CPT

## 2023-05-11 DIAGNOSIS — E78.5 DYSLIPIDEMIA: ICD-10-CM

## 2023-05-11 DIAGNOSIS — E11.9 TYPE 2 DIABETES MELLITUS WITHOUT COMPLICATION, WITHOUT LONG-TERM CURRENT USE OF INSULIN (HCC): ICD-10-CM

## 2023-05-11 RX ORDER — ROSUVASTATIN CALCIUM 5 MG/1
5 TABLET, COATED ORAL NIGHTLY
Qty: 90 TABLET | Refills: 0 | Status: SHIPPED | OUTPATIENT
Start: 2023-05-11

## 2023-05-11 RX ORDER — METFORMIN HYDROCHLORIDE 750 MG/1
TABLET, EXTENDED RELEASE ORAL
Qty: 90 TABLET | Refills: 0 | Status: SHIPPED | OUTPATIENT
Start: 2023-05-11

## 2023-06-30 DIAGNOSIS — E55.9 VITAMIN D DEFICIENCY: ICD-10-CM

## 2023-06-30 RX ORDER — ERGOCALCIFEROL 1.25 MG/1
CAPSULE ORAL
Qty: 12 CAPSULE | Refills: 0 | Status: SHIPPED | OUTPATIENT
Start: 2023-06-30

## 2023-07-06 DIAGNOSIS — I10 ESSENTIAL HYPERTENSION: ICD-10-CM

## 2023-07-06 RX ORDER — CANDESARTAN 16 MG/1
TABLET ORAL
Qty: 90 TABLET | Refills: 0 | Status: SHIPPED | OUTPATIENT
Start: 2023-07-06

## 2023-08-04 ENCOUNTER — OFFICE VISIT (OUTPATIENT)
Dept: FAMILY MEDICINE CLINIC | Age: 58
End: 2023-08-04
Payer: COMMERCIAL

## 2023-08-04 VITALS
OXYGEN SATURATION: 97 % | DIASTOLIC BLOOD PRESSURE: 84 MMHG | SYSTOLIC BLOOD PRESSURE: 132 MMHG | HEART RATE: 80 BPM | BODY MASS INDEX: 24.95 KG/M2 | HEIGHT: 68 IN | TEMPERATURE: 97.2 F | WEIGHT: 164.6 LBS

## 2023-08-04 DIAGNOSIS — Z71.6 TOBACCO ABUSE COUNSELING: ICD-10-CM

## 2023-08-04 DIAGNOSIS — E55.9 VITAMIN D DEFICIENCY: ICD-10-CM

## 2023-08-04 DIAGNOSIS — E11.69 TYPE 2 DIABETES MELLITUS WITH OTHER SPECIFIED COMPLICATION, WITHOUT LONG-TERM CURRENT USE OF INSULIN (HCC): ICD-10-CM

## 2023-08-04 DIAGNOSIS — L30.9 ECZEMA, UNSPECIFIED TYPE: ICD-10-CM

## 2023-08-04 DIAGNOSIS — I10 ESSENTIAL HYPERTENSION: Primary | ICD-10-CM

## 2023-08-04 DIAGNOSIS — N40.0 BENIGN PROSTATIC HYPERPLASIA, UNSPECIFIED WHETHER LOWER URINARY TRACT SYMPTOMS PRESENT: ICD-10-CM

## 2023-08-04 DIAGNOSIS — Z87.891 PERSONAL HISTORY OF TOBACCO USE: ICD-10-CM

## 2023-08-04 DIAGNOSIS — Z72.0 TOBACCO ABUSE DISORDER: ICD-10-CM

## 2023-08-04 DIAGNOSIS — E78.5 DYSLIPIDEMIA: ICD-10-CM

## 2023-08-04 PROCEDURE — 3079F DIAST BP 80-89 MM HG: CPT | Performed by: FAMILY MEDICINE

## 2023-08-04 PROCEDURE — 3044F HG A1C LEVEL LT 7.0%: CPT | Performed by: FAMILY MEDICINE

## 2023-08-04 PROCEDURE — 99214 OFFICE O/P EST MOD 30 MIN: CPT | Performed by: FAMILY MEDICINE

## 2023-08-04 PROCEDURE — 3075F SYST BP GE 130 - 139MM HG: CPT | Performed by: FAMILY MEDICINE

## 2023-08-04 ASSESSMENT — ANXIETY QUESTIONNAIRES
3. WORRYING TOO MUCH ABOUT DIFFERENT THINGS: 0
6. BECOMING EASILY ANNOYED OR IRRITABLE: 0
IF YOU CHECKED OFF ANY PROBLEMS ON THIS QUESTIONNAIRE, HOW DIFFICULT HAVE THESE PROBLEMS MADE IT FOR YOU TO DO YOUR WORK, TAKE CARE OF THINGS AT HOME, OR GET ALONG WITH OTHER PEOPLE: NOT DIFFICULT AT ALL
2. NOT BEING ABLE TO STOP OR CONTROL WORRYING: 0
7. FEELING AFRAID AS IF SOMETHING AWFUL MIGHT HAPPEN: 0
1. FEELING NERVOUS, ANXIOUS, OR ON EDGE: 0
GAD7 TOTAL SCORE: 0
4. TROUBLE RELAXING: 0
5. BEING SO RESTLESS THAT IT IS HARD TO SIT STILL: 0

## 2023-08-04 ASSESSMENT — ENCOUNTER SYMPTOMS
EYE DISCHARGE: 0
VOMITING: 0
NAUSEA: 0
EYE REDNESS: 0
SHORTNESS OF BREATH: 0
PHOTOPHOBIA: 0
CONSTIPATION: 0
EYE PAIN: 0
COUGH: 0
DIARRHEA: 0
SORE THROAT: 0
BLOOD IN STOOL: 0
ABDOMINAL PAIN: 0
STRIDOR: 0
BACK PAIN: 0

## 2023-08-04 ASSESSMENT — PATIENT HEALTH QUESTIONNAIRE - PHQ9
SUM OF ALL RESPONSES TO PHQ QUESTIONS 1-9: 0
SUM OF ALL RESPONSES TO PHQ QUESTIONS 1-9: 0
SUM OF ALL RESPONSES TO PHQ9 QUESTIONS 1 & 2: 0
SUM OF ALL RESPONSES TO PHQ QUESTIONS 1-9: 0
1. LITTLE INTEREST OR PLEASURE IN DOING THINGS: 0
2. FEELING DOWN, DEPRESSED OR HOPELESS: 0
SUM OF ALL RESPONSES TO PHQ9 QUESTIONS 1 & 2: 0
SUM OF ALL RESPONSES TO PHQ QUESTIONS 1-9: 0
2. FEELING DOWN, DEPRESSED OR HOPELESS: 0
1. LITTLE INTEREST OR PLEASURE IN DOING THINGS: 0
SUM OF ALL RESPONSES TO PHQ QUESTIONS 1-9: 0

## 2023-08-04 NOTE — PROGRESS NOTES
provider will review medication list per providers request  provider will also re take BP a second time if needed per her request
well-developed. He is not diaphoretic. HENT:      Head: Normocephalic and atraumatic. Right Ear: External ear normal.      Left Ear: External ear normal.      Nose: Nose normal.      Mouth/Throat:      Mouth: Mucous membranes are moist.      Pharynx: No oropharyngeal exudate. Eyes:      General: No scleral icterus. Right eye: No discharge. Left eye: No discharge. Conjunctiva/sclera: Conjunctivae normal.      Pupils: Pupils are equal, round, and reactive to light. Neck:      Thyroid: No thyromegaly. Vascular: No JVD. Trachea: No tracheal deviation. Cardiovascular:      Rate and Rhythm: Normal rate and regular rhythm. Pulses: Normal pulses. Heart sounds: Normal heart sounds. No murmur heard. No friction rub. No gallop. Pulmonary:      Effort: Pulmonary effort is normal. No respiratory distress. Breath sounds: Normal breath sounds. No wheezing or rales. Chest:      Chest wall: No tenderness. Abdominal:      General: Bowel sounds are normal. There is no distension. Palpations: Abdomen is soft. There is no mass. Tenderness: There is no abdominal tenderness. There is no guarding or rebound. Musculoskeletal:         General: No tenderness or deformity. Normal range of motion. Cervical back: Normal range of motion and neck supple. Lymphadenopathy:      Cervical: No cervical adenopathy. Skin:     General: Skin is warm and dry. Coloration: Skin is not pale. Findings: No erythema or rash. Neurological:      General: No focal deficit present. Mental Status: He is alert and oriented to person, place, and time. Mental status is at baseline. Cranial Nerves: No cranial nerve deficit. Motor: No abnormal muscle tone. Coordination: Coordination normal.      Deep Tendon Reflexes: Reflexes are normal and symmetric.  Reflexes normal.   Psychiatric:         Mood and Affect: Mood normal.         Behavior: Behavior normal.

## 2023-08-05 ENCOUNTER — HOSPITAL ENCOUNTER (OUTPATIENT)
Age: 58
Discharge: HOME | End: 2023-08-05
Payer: COMMERCIAL

## 2023-08-05 DIAGNOSIS — E78.5 DYSLIPIDEMIA: ICD-10-CM

## 2023-08-05 DIAGNOSIS — E11.69 TYPE 2 DIABETES MELLITUS WITH OTHER SPECIFIED COMPLICATION, WITHOUT LONG-TERM CURRENT USE OF INSULIN (HCC): ICD-10-CM

## 2023-08-05 LAB
ALBUMIN SERPL-MCNC: 4.2 G/DL (ref 3.5–5.2)
ALBUMIN/GLOB SERPL: 1.7 {RATIO} (ref 1–2.5)
ALP SERPL-CCNC: 92 U/L (ref 40–129)
ALT SERPL-CCNC: 10 U/L (ref 5–41)
ANION GAP SERPL CALCULATED.3IONS-SCNC: 13 MMOL/L (ref 9–17)
AST SERPL-CCNC: 12 U/L
BILIRUB SERPL-MCNC: 0.6 MG/DL (ref 0.3–1.2)
BUN SERPL-MCNC: 17 MG/DL (ref 6–20)
CALCIUM SERPL-MCNC: 9.4 MG/DL (ref 8.6–10.4)
CHLORIDE SERPL-SCNC: 106 MMOL/L (ref 98–107)
CHOLEST SERPL-MCNC: 135 MG/DL
CHOLESTEROL/HDL RATIO: 3.8
CO2 SERPL-SCNC: 23 MMOL/L (ref 20–31)
CREAT SERPL-MCNC: 0.9 MG/DL (ref 0.7–1.2)
CREAT UR-MCNC: 251.7 MG/DL (ref 39–259)
GFR SERPL CREATININE-BSD FRML MDRD: >60 ML/MIN/1.73M2
GLUCOSE SERPL-MCNC: 145 MG/DL (ref 70–99)
HDLC SERPL-MCNC: 36 MG/DL
LDLC SERPL CALC-MCNC: 83 MG/DL (ref 0–130)
MICROALBUMIN UR-MCNC: <12 MG/L
MICROALBUMIN/CREAT UR-RTO: NORMAL MCG/MG CREAT
POTASSIUM SERPL-SCNC: 3.8 MMOL/L (ref 3.7–5.3)
PROT SERPL-MCNC: 6.7 G/DL (ref 6.4–8.3)
SODIUM SERPL-SCNC: 142 MMOL/L (ref 135–144)
TRIGL SERPL-MCNC: 81 MG/DL

## 2023-08-05 PROCEDURE — 80053 COMPREHEN METABOLIC PANEL: CPT

## 2023-08-05 PROCEDURE — 80061 LIPID PANEL: CPT

## 2023-08-05 PROCEDURE — 82043 UR ALBUMIN QUANTITATIVE: CPT

## 2023-08-05 PROCEDURE — 82570 ASSAY OF URINE CREATININE: CPT

## 2023-08-05 PROCEDURE — 36415 COLL VENOUS BLD VENIPUNCTURE: CPT

## 2023-08-05 PROCEDURE — 83036 HEMOGLOBIN GLYCOSYLATED A1C: CPT

## 2023-08-06 LAB
EST. AVERAGE GLUCOSE BLD GHB EST-MCNC: 140 MG/DL
HBA1C MFR BLD: 6.5 % (ref 4–6)

## 2023-08-09 ENCOUNTER — TELEPHONE (OUTPATIENT)
Dept: ONCOLOGY | Age: 58
End: 2023-08-09

## 2023-08-14 ENCOUNTER — TELEPHONE (OUTPATIENT)
Dept: FAMILY MEDICINE CLINIC | Age: 58
End: 2023-08-14

## 2023-08-14 NOTE — TELEPHONE ENCOUNTER
Pt called and stated that he got his blood work done and he stated that Dr Carlos A Shipley stated that he has something in his lungs and he has a CT lung screening on 08/18/2023 please advise

## 2023-08-14 NOTE — TELEPHONE ENCOUNTER
Pt wants to know if he can get his shoulder surgery done by Dr Maria Eugenia Mancilla please advise he stated that Dr Maria Eugenia Mancilla told him to contact you to see if you are ok with him having the surgery

## 2023-08-25 ENCOUNTER — HOSPITAL ENCOUNTER (OUTPATIENT)
Dept: CT IMAGING | Facility: CLINIC | Age: 58
Discharge: HOME | End: 2023-08-25
Attending: FAMILY MEDICINE
Payer: COMMERCIAL

## 2023-08-25 DIAGNOSIS — Z87.891 PERSONAL HISTORY OF TOBACCO USE: ICD-10-CM

## 2023-08-25 PROCEDURE — 71271 CT THORAX LUNG CANCER SCR C-: CPT

## 2023-08-28 DIAGNOSIS — E78.5 DYSLIPIDEMIA: ICD-10-CM

## 2023-08-28 DIAGNOSIS — E11.9 TYPE 2 DIABETES MELLITUS WITHOUT COMPLICATION, WITHOUT LONG-TERM CURRENT USE OF INSULIN (HCC): ICD-10-CM

## 2023-08-28 RX ORDER — ROSUVASTATIN CALCIUM 5 MG/1
5 TABLET, COATED ORAL NIGHTLY
Qty: 90 TABLET | Refills: 0 | Status: SHIPPED | OUTPATIENT
Start: 2023-08-28

## 2023-08-28 RX ORDER — METFORMIN HYDROCHLORIDE 750 MG/1
TABLET, EXTENDED RELEASE ORAL
Qty: 90 TABLET | Refills: 0 | Status: SHIPPED | OUTPATIENT
Start: 2023-08-28

## 2023-09-27 DIAGNOSIS — E55.9 VITAMIN D DEFICIENCY: Primary | ICD-10-CM

## 2023-09-27 DIAGNOSIS — E55.9 VITAMIN D DEFICIENCY: ICD-10-CM

## 2023-09-27 RX ORDER — ERGOCALCIFEROL 1.25 MG/1
CAPSULE ORAL
Qty: 12 CAPSULE | Refills: 0 | OUTPATIENT
Start: 2023-09-27

## 2023-09-27 RX ORDER — ACETAMINOPHEN 160 MG
1 TABLET,DISINTEGRATING ORAL DAILY
Qty: 90 CAPSULE | Refills: 5 | Status: SHIPPED | OUTPATIENT
Start: 2023-09-27

## 2023-10-08 DIAGNOSIS — I10 ESSENTIAL HYPERTENSION: ICD-10-CM

## 2023-10-09 RX ORDER — CANDESARTAN 16 MG/1
TABLET ORAL
Qty: 90 TABLET | Refills: 0 | Status: SHIPPED | OUTPATIENT
Start: 2023-10-09

## 2023-11-02 ENCOUNTER — TELEPHONE (OUTPATIENT)
Dept: ONCOLOGY | Age: 58
End: 2023-11-02

## 2023-11-02 DIAGNOSIS — R91.8 ABNORMAL CT LUNG SCREENING: Primary | ICD-10-CM

## 2023-11-02 NOTE — TELEPHONE ENCOUNTER
Our records indicate that your patient is coming due for their recommended 3 month follow up testing from  lung cancer screening. For your convenience, we have pended the order for the scan for you. If you do not agree with the need for the test, please cancel the order and let us know.      Sincerely,    [id number]

## 2024-06-18 ENCOUNTER — TELEPHONE (OUTPATIENT)
Dept: FAMILY MEDICINE CLINIC | Age: 59
End: 2024-06-18

## 2025-04-03 ENCOUNTER — OFFICE VISIT (OUTPATIENT)
Dept: FAMILY MEDICINE CLINIC | Age: 60
End: 2025-04-03
Payer: COMMERCIAL

## 2025-04-03 ENCOUNTER — HOSPITAL ENCOUNTER (EMERGENCY)
Facility: CLINIC | Age: 60
Discharge: HOME | End: 2025-04-03
Attending: EMERGENCY MEDICINE
Payer: COMMERCIAL

## 2025-04-03 ENCOUNTER — APPOINTMENT (OUTPATIENT)
Dept: GENERAL RADIOLOGY | Facility: CLINIC | Age: 60
End: 2025-04-03
Payer: COMMERCIAL

## 2025-04-03 VITALS
HEIGHT: 67 IN | SYSTOLIC BLOOD PRESSURE: 130 MMHG | OXYGEN SATURATION: 97 % | HEART RATE: 82 BPM | DIASTOLIC BLOOD PRESSURE: 80 MMHG | BODY MASS INDEX: 26.4 KG/M2 | WEIGHT: 168.2 LBS

## 2025-04-03 VITALS
HEIGHT: 70 IN | HEART RATE: 90 BPM | BODY MASS INDEX: 24.05 KG/M2 | WEIGHT: 168 LBS | OXYGEN SATURATION: 94 % | RESPIRATION RATE: 20 BRPM | TEMPERATURE: 98 F | DIASTOLIC BLOOD PRESSURE: 65 MMHG | SYSTOLIC BLOOD PRESSURE: 118 MMHG

## 2025-04-03 DIAGNOSIS — E11.69 TYPE 2 DIABETES MELLITUS WITH OTHER SPECIFIED COMPLICATION, WITHOUT LONG-TERM CURRENT USE OF INSULIN: ICD-10-CM

## 2025-04-03 DIAGNOSIS — E78.5 DYSLIPIDEMIA: ICD-10-CM

## 2025-04-03 DIAGNOSIS — Z23 NEED FOR PNEUMOCOCCAL 20-VALENT CONJUGATE VACCINATION: ICD-10-CM

## 2025-04-03 DIAGNOSIS — Z91.89 HIGH RISK OF CARDIAC EVENT: ICD-10-CM

## 2025-04-03 DIAGNOSIS — Z87.891 PERSONAL HISTORY OF TOBACCO USE: ICD-10-CM

## 2025-04-03 DIAGNOSIS — Z12.11 SCREEN FOR COLON CANCER: ICD-10-CM

## 2025-04-03 DIAGNOSIS — E55.9 VITAMIN D DEFICIENCY: ICD-10-CM

## 2025-04-03 DIAGNOSIS — I10 ESSENTIAL HYPERTENSION: Primary | ICD-10-CM

## 2025-04-03 DIAGNOSIS — E79.0 HYPERURICEMIA: ICD-10-CM

## 2025-04-03 DIAGNOSIS — Z72.0 TOBACCO ABUSE DISORDER: ICD-10-CM

## 2025-04-03 DIAGNOSIS — Z71.6 TOBACCO ABUSE COUNSELING: ICD-10-CM

## 2025-04-03 DIAGNOSIS — Z12.5 SCREENING FOR MALIGNANT NEOPLASM OF PROSTATE: ICD-10-CM

## 2025-04-03 DIAGNOSIS — N40.0 BENIGN PROSTATIC HYPERPLASIA, UNSPECIFIED WHETHER LOWER URINARY TRACT SYMPTOMS PRESENT: ICD-10-CM

## 2025-04-03 DIAGNOSIS — E53.9 VITAMIN B DEFICIENCY: ICD-10-CM

## 2025-04-03 DIAGNOSIS — R09.89 PULMONARY VASCULAR CONGESTION: ICD-10-CM

## 2025-04-03 DIAGNOSIS — Z13.29 SCREENING FOR THYROID DISORDER: ICD-10-CM

## 2025-04-03 DIAGNOSIS — R42 DIZZINESS: ICD-10-CM

## 2025-04-03 DIAGNOSIS — R07.89 CHEST WALL PAIN: Primary | ICD-10-CM

## 2025-04-03 PROBLEM — M75.42 SUBACROMIAL IMPINGEMENT, LEFT: Status: ACTIVE | Noted: 2023-10-24

## 2025-04-03 PROBLEM — M75.102 TEAR OF LEFT ROTATOR CUFF: Status: ACTIVE | Noted: 2023-09-11

## 2025-04-03 PROBLEM — M19.012 OSTEOARTHRITIS OF LEFT SHOULDER: Status: ACTIVE | Noted: 2023-09-11

## 2025-04-03 LAB
ANION GAP SERPL CALCULATED.3IONS-SCNC: 10 MMOL/L (ref 9–16)
BASOPHILS # BLD: 0 K/UL (ref 0–0.2)
BASOPHILS NFR BLD: 0 % (ref 0–2)
BNP SERPL-MCNC: 57 PG/ML (ref 0–300)
BUN SERPL-MCNC: 20 MG/DL (ref 6–20)
CALCIUM SERPL-MCNC: 9.2 MG/DL (ref 8.6–10.4)
CHLORIDE SERPL-SCNC: 103 MMOL/L (ref 98–107)
CO2 SERPL-SCNC: 25 MMOL/L (ref 20–31)
CREAT SERPL-MCNC: 1.1 MG/DL (ref 0.7–1.2)
D DIMER PPP FEU-MCNC: 0.33 UG/ML FEU
EOSINOPHIL # BLD: 0.2 K/UL (ref 0–0.4)
EOSINOPHILS RELATIVE PERCENT: 2 % (ref 1–4)
ERYTHROCYTE [DISTWIDTH] IN BLOOD BY AUTOMATED COUNT: 13.3 % (ref 12.5–15.4)
GFR, ESTIMATED: 78 ML/MIN/1.73M2
GLUCOSE SERPL-MCNC: 194 MG/DL (ref 74–99)
HCT VFR BLD AUTO: 41.8 % (ref 41–53)
HGB BLD-MCNC: 14.6 G/DL (ref 13.5–17.5)
LYMPHOCYTES NFR BLD: 2.4 K/UL (ref 1–4.8)
LYMPHOCYTES RELATIVE PERCENT: 25 % (ref 24–44)
MCH RBC QN AUTO: 31.6 PG (ref 26–34)
MCHC RBC AUTO-ENTMCNC: 35 G/DL (ref 31–37)
MCV RBC AUTO: 90.4 FL (ref 80–100)
MONOCYTES NFR BLD: 0.8 K/UL (ref 0.1–1.2)
MONOCYTES NFR BLD: 9 % (ref 2–11)
NEUTROPHILS NFR BLD: 64 % (ref 36–66)
NEUTS SEG NFR BLD: 6 K/UL (ref 1.8–7.7)
PLATELET # BLD AUTO: 323 K/UL (ref 140–450)
PMV BLD AUTO: 6.9 FL (ref 6–12)
POTASSIUM SERPL-SCNC: 4.1 MMOL/L (ref 3.7–5.3)
RBC # BLD AUTO: 4.63 M/UL (ref 4.5–5.9)
SODIUM SERPL-SCNC: 138 MMOL/L (ref 136–145)
TROPONIN I SERPL HS-MCNC: 9 NG/L (ref 0–22)
TROPONIN I SERPL HS-MCNC: 9 NG/L (ref 0–22)
WBC OTHER # BLD: 9.3 K/UL (ref 3.5–11)

## 2025-04-03 PROCEDURE — 99214 OFFICE O/P EST MOD 30 MIN: CPT | Performed by: FAMILY MEDICINE

## 2025-04-03 PROCEDURE — 90471 IMMUNIZATION ADMIN: CPT | Performed by: FAMILY MEDICINE

## 2025-04-03 PROCEDURE — 93005 ELECTROCARDIOGRAM TRACING: CPT

## 2025-04-03 PROCEDURE — 83880 ASSAY OF NATRIURETIC PEPTIDE: CPT

## 2025-04-03 PROCEDURE — 71045 X-RAY EXAM CHEST 1 VIEW: CPT

## 2025-04-03 PROCEDURE — 80048 BASIC METABOLIC PNL TOTAL CA: CPT

## 2025-04-03 PROCEDURE — 6360000002 HC RX W HCPCS

## 2025-04-03 PROCEDURE — 85025 COMPLETE CBC W/AUTO DIFF WBC: CPT

## 2025-04-03 PROCEDURE — 99285 EMERGENCY DEPT VISIT HI MDM: CPT

## 2025-04-03 PROCEDURE — 3079F DIAST BP 80-89 MM HG: CPT | Performed by: FAMILY MEDICINE

## 2025-04-03 PROCEDURE — 90677 PCV20 VACCINE IM: CPT | Performed by: FAMILY MEDICINE

## 2025-04-03 PROCEDURE — 84484 ASSAY OF TROPONIN QUANT: CPT

## 2025-04-03 PROCEDURE — 85379 FIBRIN DEGRADATION QUANT: CPT

## 2025-04-03 PROCEDURE — 3075F SYST BP GE 130 - 139MM HG: CPT | Performed by: FAMILY MEDICINE

## 2025-04-03 PROCEDURE — 96374 THER/PROPH/DIAG INJ IV PUSH: CPT

## 2025-04-03 RX ORDER — KETOROLAC TROMETHAMINE 15 MG/ML
15 INJECTION, SOLUTION INTRAMUSCULAR; INTRAVENOUS ONCE
Status: COMPLETED | OUTPATIENT
Start: 2025-04-03 | End: 2025-04-03

## 2025-04-03 RX ADMIN — KETOROLAC TROMETHAMINE 15 MG: 15 INJECTION, SOLUTION INTRAMUSCULAR; INTRAVENOUS at 18:48

## 2025-04-03 SDOH — ECONOMIC STABILITY: FOOD INSECURITY: WITHIN THE PAST 12 MONTHS, THE FOOD YOU BOUGHT JUST DIDN'T LAST AND YOU DIDN'T HAVE MONEY TO GET MORE.: NEVER TRUE

## 2025-04-03 SDOH — ECONOMIC STABILITY: FOOD INSECURITY: WITHIN THE PAST 12 MONTHS, YOU WORRIED THAT YOUR FOOD WOULD RUN OUT BEFORE YOU GOT MONEY TO BUY MORE.: NEVER TRUE

## 2025-04-03 ASSESSMENT — ANXIETY QUESTIONNAIRES
1. FEELING NERVOUS, ANXIOUS, OR ON EDGE: NOT AT ALL
7. FEELING AFRAID AS IF SOMETHING AWFUL MIGHT HAPPEN: NOT AT ALL
IF YOU CHECKED OFF ANY PROBLEMS ON THIS QUESTIONNAIRE, HOW DIFFICULT HAVE THESE PROBLEMS MADE IT FOR YOU TO DO YOUR WORK, TAKE CARE OF THINGS AT HOME, OR GET ALONG WITH OTHER PEOPLE: NOT DIFFICULT AT ALL
6. BECOMING EASILY ANNOYED OR IRRITABLE: NOT AT ALL
5. BEING SO RESTLESS THAT IT IS HARD TO SIT STILL: NOT AT ALL
GAD7 TOTAL SCORE: 0
2. NOT BEING ABLE TO STOP OR CONTROL WORRYING: NOT AT ALL
3. WORRYING TOO MUCH ABOUT DIFFERENT THINGS: NOT AT ALL
4. TROUBLE RELAXING: NOT AT ALL

## 2025-04-03 ASSESSMENT — ENCOUNTER SYMPTOMS
PHOTOPHOBIA: 0
BACK PAIN: 0
EYE REDNESS: 0
ABDOMINAL PAIN: 0
SORE THROAT: 0
STRIDOR: 0
SHORTNESS OF BREATH: 0
COUGH: 0
BLOOD IN STOOL: 0
NAUSEA: 0
DIARRHEA: 0
CONSTIPATION: 0
EYE DISCHARGE: 0
VOMITING: 0
EYE PAIN: 0

## 2025-04-03 ASSESSMENT — PATIENT HEALTH QUESTIONNAIRE - PHQ9
SUM OF ALL RESPONSES TO PHQ QUESTIONS 1-9: 0
SUM OF ALL RESPONSES TO PHQ QUESTIONS 1-9: 0
1. LITTLE INTEREST OR PLEASURE IN DOING THINGS: NOT AT ALL
2. FEELING DOWN, DEPRESSED OR HOPELESS: NOT AT ALL
SUM OF ALL RESPONSES TO PHQ QUESTIONS 1-9: 0
SUM OF ALL RESPONSES TO PHQ QUESTIONS 1-9: 0

## 2025-04-03 ASSESSMENT — PAIN DESCRIPTION - FREQUENCY: FREQUENCY: INTERMITTENT

## 2025-04-03 ASSESSMENT — PAIN - FUNCTIONAL ASSESSMENT: PAIN_FUNCTIONAL_ASSESSMENT: 0-10

## 2025-04-03 ASSESSMENT — PAIN DESCRIPTION - LOCATION: LOCATION: CHEST

## 2025-04-03 ASSESSMENT — PAIN SCALES - GENERAL: PAINLEVEL_OUTOF10: 4

## 2025-04-03 ASSESSMENT — PAIN DESCRIPTION - DESCRIPTORS: DESCRIPTORS: ACHING

## 2025-04-03 ASSESSMENT — PAIN DESCRIPTION - PAIN TYPE: TYPE: ACUTE PAIN

## 2025-04-03 ASSESSMENT — PAIN DESCRIPTION - ORIENTATION: ORIENTATION: LEFT

## 2025-04-03 NOTE — ED NOTES
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 Report when he got off today, the pain stayed persistent without radiation. Denies N/V/D, denies shortness of breath. Denies cardiac history. Pt alert and oriented, PWD, PMS intact.         C= \"Have you ever felt that you should Cut down on your drinking?\"  No  A= \"Have people Annoyed you by criticizing your drinking?\"  No  G= \"Have you ever felt bad or Guilty about your drinking?\"  No  E= \"Have you ever had a drink as an Eye-opener first thing in the morning to steady your nerves or to help a hangover?\"  No      Deferred []      Reason for deferring: N/A    *If yes to two or more: probable alcohol abuse.*

## 2025-04-03 NOTE — ED PROVIDER NOTES
[facility]  [address] [address]  Phone: [phone]      Patient Name:  Ramu Arce  Medical Record Number:  [id number]  YOB: 1965  Date of Service:  4/3/2025  Primary Care Physician:  Alexandra Gardner MD      CHIEF COMPLAINT:       Chief Complaint   Patient presents with    Chest Pain     Intermittent left side chest pain, started today       HISTORY OF PRESENT ILLNESS:    Ramu Arce is a 59 y.o. male who presents with the complaint of left anterior chest wall pain that it began earlier today.  Denies any shortness of breath with it.  Is nonradiating right over the left anterior pectoralis muscle.  Denies any nausea, vomiting, constipation, diarrhea, lower urinary tract symptoms.    CURRENT MEDICATIONS:      Previous Medications    TRIAMCINOLONE (KENALOG) 0.1 % OINTMENT    Use BID x 2 weeks, then QD x 2 weeks , then every other day x 2 weeks and stop       ALLERGIES:   has no known allergies.    PAST MEDICAL HISTORY:    has a past medical history of Erectile dysfunction.    SURGICAL HISTORY:      has a past surgical history that includes Arm Surgery and Arm Surgery (Left, 3/7/2023).    FAMILY HISTORY:   He indicated that his mother is . He indicated that his father is alive.     family history includes Cancer in his mother; Coronary Art Dis in his father; Diabetes in his father; Heart Disease in his father.    SOCIAL HISTORY:     reports that he has been smoking cigarettes. He has a 40 pack-year smoking history. He has never used smokeless tobacco. He reports current drug use. Drug: Marijuana (Weed). He reports that he does not drink alcohol.    IMMUNIZATION HISTORY:      Immunization History   Administered Date(s) Administered    Pneumococcal, PCV20, PREVNAR 20, (age 6w+), IM, 0.5mL 2025    Pneumococcal, PPSV23, PNEUMOVAX 23, (age 2y+), SC/IM, 0.5mL 2022    TDaP, ADACEL (age 10y-64y), BOOSTRIX (age 10y+), IM, 0.5mL 2021       PHYSICAL

## 2025-04-03 NOTE — ED PROVIDER NOTES
[facility]  [address] [address]  Phone: [phone]      Attending Physician Attestation          CHIEF COMPLAINT       Chief Complaint   Patient presents with    Chest Pain     Intermittent left side chest pain, started today       DIAGNOSTIC RESULTS     LABS:  Labs Reviewed   CBC WITH AUTO DIFFERENTIAL   D-DIMER, QUANTITATIVE   BASIC METABOLIC PANEL   TROPONIN       All other labs were within normal range or not returned as of this dictation.    RADIOLOGY:  XR CHEST PORTABLE    (Results Pending)         EMERGENCY DEPARTMENT COURSE:   Vitals:    Vitals:    04/03/25 1836   BP: (!) 159/97   Pulse: (!) 109   Resp: 20   Temp: 98 °F (36.7 °C)   TempSrc: Oral   SpO2: 96%   Weight: 76.2 kg (168 lb)   Height: 1.778 m (5' 10\")     -------------------------  BP: (!) 159/97, Temp: 98 °F (36.7 °C), Pulse: (!) 109, Respirations: 20             PERTINENT ATTENDING PHYSICIAN COMMENTS:    I performed a history and physical examination of the patient and discussed management with the mid level provider. I reviewed the mid level provider's note and agree with the documented findings and plan of care. Any areas of disagreement are noted on the chart. I was personally present for the key portions of any procedures. I have documented in the chart those procedures where I was not present during the key portions. I have reviewed the emergency nurses triage note. I agree with the chief complaint, past medical history, past surgical history, allergies, medications, social and family history as documented unless otherwise noted below. Documentation of the HPI, Physical Exam and Medical Decision Making performed by mid level providers is based on my personal performance of the HPI, PE and MDM. For Residents, Physician Assistant/ Nurse Practitioner cases/documentation I have personally evaluated this patient and have completed at least one if not all key elements of the E/M (history, physical

## 2025-04-03 NOTE — PROGRESS NOTES
Subjective:      Patient ID: Ramu Arce is a 59 y.o. male.    Here for a follow up of HTN, Dyslipidemia,Diabetes, Tobacco abuse disorder after almost 2   years of medical non compliance with follow up. CC of feeling dizzy.  States has had dizziness x 2 episodes 2 weeks ago when he got OOB and second episode 4 days ago. States never had this happen in the past.  States not been taking any meds for a year- was working out of town and just returned to [place].  Shelley suse restroom freq. Drinks 3 bottles of water- advice dto increase to 4-5 a day.  May be dehydrated. Also BS probably OOC- will do labs and recheck early next week to restart meds.  Reduced smoking to 1/2 PPD x last year- no cough or SOB per patient  Review of Systems   Constitutional:  Negative for chills and fever.   HENT:  Negative for congestion, ear pain, hearing loss, nosebleeds, sore throat and tinnitus.    Eyes:  Negative for photophobia, pain, discharge and redness.   Respiratory:  Negative for cough, shortness of breath and stridor.    Cardiovascular:  Negative for chest pain, palpitations and leg swelling.   Gastrointestinal:  Negative for abdominal pain, blood in stool, constipation, diarrhea, nausea and vomiting.   Endocrine: Negative for polydipsia.   Genitourinary:  Negative for dysuria, flank pain, frequency, hematuria and urgency.   Musculoskeletal:  Negative for back pain, myalgias and neck pain.   Skin:  Negative for rash.   Allergic/Immunologic: Negative for environmental allergies.   Neurological:  Negative for dizziness, tremors, seizures, weakness and headaches.   Hematological:  Does not bruise/bleed easily.   Psychiatric/Behavioral:  Negative for hallucinations and suicidal ideas. The patient is not nervous/anxious.      Objective:   Physical Exam  Constitutional:       General: He is not in acute distress.     Appearance: He is well-developed. He is not diaphoretic.   HENT:      Head: Normocephalic and atraumatic.      Right Ear:

## 2025-04-03 NOTE — PATIENT INSTRUCTIONS
Thank you for choosing [facility].  We know you have options when it comes to your healthcare; we appreciate that you chose us. Our goal is to provide exceptional  service and world class care to every patient.  You will be receiving a survey via email or text message asking for your feedback.  Please take a few minutes to share your thoughts about your recent visit. Your comments helps us understand what we do well and ways we can improve.  Thank you in advance for your valuable feedback.    Patient Education        Deciding About Using Medicines To Quit Smoking  How can you decide about using medicines to quit smoking?  What are the medicines you can use?  Your doctor may prescribe varenicline (Chantix) or bupropion SR. These medicines can help you cope with cravings for tobacco. They are pills that don't contain nicotine.  You also can use nicotine replacement products. These do contain nicotine. There are many types.  Gum and lozenges slowly release nicotine into your mouth.  Patches stick to your skin. They slowly release nicotine into your bloodstream.  An inhaler has a ralph that contains nicotine. You breathe in a puff of nicotine vapor through your mouth and throat.  Nasal spray releases a mist that contains nicotine.  What are key points about this decision?  Using medicines can increase your chances of quitting smoking. They can ease cravings and withdrawal symptoms.  Getting counseling along with using medicine can raise your chances of quitting even more.  These nicotine replacement products have less nicotine than cigarettes. And by itself, nicotine is not nearly as harmful as smoking. The tars, carbon monoxide, and other toxic chemicals in tobacco cause the harmful effects.  The side effects of nicotine replacement products depend on the type of product. For example, a patch can make your skin red and itchy. Medicines in pill form can make you sick to your stomach. They can also cause dry mouth and

## 2025-04-04 LAB
EKG ATRIAL RATE: 109 BPM
EKG P AXIS: 14 DEGREES
EKG P-R INTERVAL: 150 MS
EKG Q-T INTERVAL: 318 MS
EKG QRS DURATION: 76 MS
EKG QTC CALCULATION (BAZETT): 428 MS
EKG R AXIS: 42 DEGREES
EKG T AXIS: 47 DEGREES
EKG VENTRICULAR RATE: 109 BPM

## 2025-04-07 ENCOUNTER — TELEPHONE (OUTPATIENT)
Dept: GASTROENTEROLOGY | Age: 60
End: 2025-04-07

## 2025-04-07 NOTE — TELEPHONE ENCOUNTER
Girlfriend Silvana  [phone]  called to schedule per referral.   Please contact anytime during day.

## 2025-04-08 ENCOUNTER — OFFICE VISIT (OUTPATIENT)
Dept: FAMILY MEDICINE CLINIC | Age: 60
End: 2025-04-08
Payer: COMMERCIAL

## 2025-04-08 VITALS
HEIGHT: 70 IN | WEIGHT: 165 LBS | DIASTOLIC BLOOD PRESSURE: 79 MMHG | BODY MASS INDEX: 23.62 KG/M2 | OXYGEN SATURATION: 96 % | SYSTOLIC BLOOD PRESSURE: 121 MMHG | HEART RATE: 91 BPM

## 2025-04-08 DIAGNOSIS — I10 ESSENTIAL HYPERTENSION: ICD-10-CM

## 2025-04-08 DIAGNOSIS — Z72.0 TOBACCO ABUSE DISORDER: ICD-10-CM

## 2025-04-08 DIAGNOSIS — E78.5 DYSLIPIDEMIA: ICD-10-CM

## 2025-04-08 DIAGNOSIS — Z71.6 TOBACCO ABUSE COUNSELING: ICD-10-CM

## 2025-04-08 DIAGNOSIS — E11.69 TYPE 2 DIABETES MELLITUS WITH OTHER SPECIFIED COMPLICATION, WITHOUT LONG-TERM CURRENT USE OF INSULIN: Primary | ICD-10-CM

## 2025-04-08 PROCEDURE — 3074F SYST BP LT 130 MM HG: CPT | Performed by: FAMILY MEDICINE

## 2025-04-08 PROCEDURE — 99213 OFFICE O/P EST LOW 20 MIN: CPT | Performed by: FAMILY MEDICINE

## 2025-04-08 PROCEDURE — 3078F DIAST BP <80 MM HG: CPT | Performed by: FAMILY MEDICINE

## 2025-04-08 ASSESSMENT — ENCOUNTER SYMPTOMS
SORE THROAT: 0
NAUSEA: 0
CONSTIPATION: 0
BLOOD IN STOOL: 0
ABDOMINAL PAIN: 0
PHOTOPHOBIA: 0
VOMITING: 0
STRIDOR: 0
EYE REDNESS: 0
EYE PAIN: 0
EYE DISCHARGE: 0
BACK PAIN: 0
SHORTNESS OF BREATH: 0
COUGH: 0
DIARRHEA: 0

## 2025-04-08 ASSESSMENT — PATIENT HEALTH QUESTIONNAIRE - PHQ9
5. POOR APPETITE OR OVEREATING: NOT AT ALL
1. LITTLE INTEREST OR PLEASURE IN DOING THINGS: NEARLY EVERY DAY
4. FEELING TIRED OR HAVING LITTLE ENERGY: NOT AT ALL
SUM OF ALL RESPONSES TO PHQ QUESTIONS 1-9: 3
SUM OF ALL RESPONSES TO PHQ QUESTIONS 1-9: 3
3. TROUBLE FALLING OR STAYING ASLEEP: NOT AT ALL
2. FEELING DOWN, DEPRESSED OR HOPELESS: NOT AT ALL
9. THOUGHTS THAT YOU WOULD BE BETTER OFF DEAD, OR OF HURTING YOURSELF: NOT AT ALL
SUM OF ALL RESPONSES TO PHQ QUESTIONS 1-9: 3
10. IF YOU CHECKED OFF ANY PROBLEMS, HOW DIFFICULT HAVE THESE PROBLEMS MADE IT FOR YOU TO DO YOUR WORK, TAKE CARE OF THINGS AT HOME, OR GET ALONG WITH OTHER PEOPLE: NOT DIFFICULT AT ALL
7. TROUBLE CONCENTRATING ON THINGS, SUCH AS READING THE NEWSPAPER OR WATCHING TELEVISION: NOT AT ALL
8. MOVING OR SPEAKING SO SLOWLY THAT OTHER PEOPLE COULD HAVE NOTICED. OR THE OPPOSITE, BEING SO FIGETY OR RESTLESS THAT YOU HAVE BEEN MOVING AROUND A LOT MORE THAN USUAL: NOT AT ALL
SUM OF ALL RESPONSES TO PHQ QUESTIONS 1-9: 3
6. FEELING BAD ABOUT YOURSELF - OR THAT YOU ARE A FAILURE OR HAVE LET YOURSELF OR YOUR FAMILY DOWN: NOT AT ALL

## 2025-04-08 NOTE — PATIENT INSTRUCTIONS
Thank you for choosing [facility].  We know you have options when it comes to your healthcare; we appreciate that you chose us. Our goal is to provide exceptional  service and world class care to every patient.  You will be receiving a survey via email or text message asking for your feedback.  Please take a few minutes to share your thoughts about your recent visit. Your comments helps us understand what we do well and ways we can improve.  Thank you in advance for your valuable feedback.    Patient Education        Deciding About Using Medicines To Quit Smoking  How can you decide about using medicines to quit smoking?  What are the medicines you can use?  Your doctor may prescribe varenicline (Chantix) or bupropion SR. These medicines can help you cope with cravings for tobacco. They are pills that don't contain nicotine.  You also can use nicotine replacement products. These do contain nicotine. There are many types.  Gum and lozenges slowly release nicotine into your mouth.  Patches stick to your skin. They slowly release nicotine into your bloodstream.  An inhaler has a ralph that contains nicotine. You breathe in a puff of nicotine vapor through your mouth and throat.  Nasal spray releases a mist that contains nicotine.  What are key points about this decision?  Using medicines can increase your chances of quitting smoking. They can ease cravings and withdrawal symptoms.  Getting counseling along with using medicine can raise your chances of quitting even more.  These nicotine replacement products have less nicotine than cigarettes. And by itself, nicotine is not nearly as harmful as smoking. The tars, carbon monoxide, and other toxic chemicals in tobacco cause the harmful effects.  The side effects of nicotine replacement products depend on the type of product. For example, a patch can make your skin red and itchy. Medicines in pill form can make you sick to your stomach. They can also cause dry mouth and

## 2025-04-08 NOTE — TELEPHONE ENCOUNTER
Patient will need an office visit with Dr. Bray prior to scheduling Colonoscopy.    Patient has an appointment scheduled with Dr. Shah on 5/15/25 for high risk of cardiac event and dizziness.

## 2025-04-09 ENCOUNTER — TELEPHONE (OUTPATIENT)
Dept: FAMILY MEDICINE CLINIC | Age: 60
End: 2025-04-09

## 2025-04-09 ENCOUNTER — HOSPITAL ENCOUNTER (OUTPATIENT)
Age: 60
Discharge: HOME | End: 2025-04-09
Payer: COMMERCIAL

## 2025-04-09 DIAGNOSIS — Z12.5 SCREENING FOR MALIGNANT NEOPLASM OF PROSTATE: ICD-10-CM

## 2025-04-09 DIAGNOSIS — E53.9 VITAMIN B DEFICIENCY: ICD-10-CM

## 2025-04-09 DIAGNOSIS — Z13.29 SCREENING FOR THYROID DISORDER: ICD-10-CM

## 2025-04-09 DIAGNOSIS — E11.69 TYPE 2 DIABETES MELLITUS WITH OTHER SPECIFIED COMPLICATION, WITHOUT LONG-TERM CURRENT USE OF INSULIN: ICD-10-CM

## 2025-04-09 DIAGNOSIS — E78.5 DYSLIPIDEMIA: ICD-10-CM

## 2025-04-09 DIAGNOSIS — E79.0 HYPERURICEMIA: ICD-10-CM

## 2025-04-09 DIAGNOSIS — E55.9 VITAMIN D DEFICIENCY: ICD-10-CM

## 2025-04-09 LAB
25(OH)D3 SERPL-MCNC: 22.7 NG/ML (ref 30–100)
ALBUMIN SERPL-MCNC: 3.9 G/DL (ref 3.5–5.2)
ALBUMIN/GLOB SERPL: 1.4 {RATIO} (ref 1–2.5)
ALP SERPL-CCNC: 120 U/L (ref 40–129)
ALT SERPL-CCNC: 24 U/L (ref 10–50)
ANION GAP SERPL CALCULATED.3IONS-SCNC: 9 MMOL/L (ref 9–16)
AST SERPL-CCNC: 24 U/L (ref 10–50)
BILIRUB SERPL-MCNC: 0.4 MG/DL (ref 0–1.2)
BUN SERPL-MCNC: 16 MG/DL (ref 6–20)
CALCIUM SERPL-MCNC: 9.6 MG/DL (ref 8.6–10.4)
CHLORIDE SERPL-SCNC: 108 MMOL/L (ref 98–107)
CHOLEST SERPL-MCNC: 222 MG/DL (ref 0–199)
CHOLESTEROL/HDL RATIO: 5.2
CO2 SERPL-SCNC: 23 MMOL/L (ref 20–31)
CREAT SERPL-MCNC: 0.9 MG/DL (ref 0.7–1.2)
CREAT UR-MCNC: 151 MG/DL (ref 39–259)
ERYTHROCYTE [DISTWIDTH] IN BLOOD BY AUTOMATED COUNT: 13 % (ref 11.8–14.4)
EST. AVERAGE GLUCOSE BLD GHB EST-MCNC: 143 MG/DL
FOLATE SERPL-MCNC: 17.7 NG/ML (ref 4.8–24.2)
GFR, ESTIMATED: >90 ML/MIN/1.73M2
GLUCOSE SERPL-MCNC: 161 MG/DL (ref 74–99)
HBA1C MFR BLD: 6.6 % (ref 4–6)
HCT VFR BLD AUTO: 45.2 % (ref 40.7–50.3)
HDLC SERPL-MCNC: 43 MG/DL
HGB BLD-MCNC: 14.9 G/DL (ref 13–17)
INSULIN REFERENCE RANGE:: NORMAL
INSULIN: 4.7 MU/L
LDLC SERPL CALC-MCNC: 153 MG/DL (ref 0–100)
MCH RBC QN AUTO: 30 PG (ref 25.2–33.5)
MCHC RBC AUTO-ENTMCNC: 33 G/DL (ref 28.4–34.8)
MCV RBC AUTO: 90.9 FL (ref 82.6–102.9)
MICROALBUMIN UR-MCNC: <12 MG/L (ref 0–20)
MICROALBUMIN/CREAT UR-RTO: NORMAL MCG/MG CREAT (ref 0–17)
NRBC BLD-RTO: 0 PER 100 WBC
PLATELET # BLD AUTO: 369 K/UL (ref 138–453)
PMV BLD AUTO: 9.2 FL (ref 8.1–13.5)
POTASSIUM SERPL-SCNC: 4.5 MMOL/L (ref 3.7–5.3)
PROT SERPL-MCNC: 6.6 G/DL (ref 6.6–8.7)
PSA SERPL-MCNC: 1.18 NG/ML (ref 0–4)
RBC # BLD AUTO: 4.97 M/UL (ref 4.21–5.77)
SODIUM SERPL-SCNC: 140 MMOL/L (ref 136–145)
TRIGL SERPL-MCNC: 128 MG/DL
TSH SERPL DL<=0.05 MIU/L-ACNC: 1.2 UIU/ML (ref 0.27–4.2)
URATE SERPL-MCNC: 5.4 MG/DL (ref 3.4–7)
VIT B12 SERPL-MCNC: 571 PG/ML (ref 232–1245)
VLDLC SERPL CALC-MCNC: 26 MG/DL (ref 1–30)
WBC OTHER # BLD: 9.5 K/UL (ref 3.5–11.3)

## 2025-04-09 PROCEDURE — 84550 ASSAY OF BLOOD/URIC ACID: CPT

## 2025-04-09 PROCEDURE — 83525 ASSAY OF INSULIN: CPT

## 2025-04-09 PROCEDURE — 85027 COMPLETE CBC AUTOMATED: CPT

## 2025-04-09 PROCEDURE — 82607 VITAMIN B-12: CPT

## 2025-04-09 PROCEDURE — 82746 ASSAY OF FOLIC ACID SERUM: CPT

## 2025-04-09 PROCEDURE — 82570 ASSAY OF URINE CREATININE: CPT

## 2025-04-09 PROCEDURE — 84443 ASSAY THYROID STIM HORMONE: CPT

## 2025-04-09 PROCEDURE — 80061 LIPID PANEL: CPT

## 2025-04-09 PROCEDURE — 82306 VITAMIN D 25 HYDROXY: CPT

## 2025-04-09 PROCEDURE — 82043 UR ALBUMIN QUANTITATIVE: CPT

## 2025-04-09 PROCEDURE — 36415 COLL VENOUS BLD VENIPUNCTURE: CPT

## 2025-04-09 PROCEDURE — G0103 PSA SCREENING: HCPCS

## 2025-04-09 PROCEDURE — 83036 HEMOGLOBIN GLYCOSYLATED A1C: CPT

## 2025-04-09 PROCEDURE — 80053 COMPREHEN METABOLIC PANEL: CPT

## 2025-04-11 ENCOUNTER — OFFICE VISIT (OUTPATIENT)
Dept: FAMILY MEDICINE CLINIC | Age: 60
End: 2025-04-11

## 2025-04-11 VITALS
BODY MASS INDEX: 23.59 KG/M2 | OXYGEN SATURATION: 97 % | HEART RATE: 90 BPM | SYSTOLIC BLOOD PRESSURE: 139 MMHG | WEIGHT: 164.8 LBS | HEIGHT: 70 IN | DIASTOLIC BLOOD PRESSURE: 82 MMHG

## 2025-04-11 DIAGNOSIS — Z72.0 TOBACCO ABUSE DISORDER: ICD-10-CM

## 2025-04-11 DIAGNOSIS — I10 ESSENTIAL HYPERTENSION: ICD-10-CM

## 2025-04-11 DIAGNOSIS — R73.03 PREDIABETES: ICD-10-CM

## 2025-04-11 DIAGNOSIS — E78.5 DYSLIPIDEMIA: ICD-10-CM

## 2025-04-11 DIAGNOSIS — Z71.6 TOBACCO ABUSE COUNSELING: ICD-10-CM

## 2025-04-11 DIAGNOSIS — E55.9 VITAMIN D DEFICIENCY: Primary | ICD-10-CM

## 2025-04-11 RX ORDER — METFORMIN HYDROCHLORIDE 750 MG/1
750 TABLET, EXTENDED RELEASE ORAL
Qty: 90 TABLET | Refills: 1 | Status: SHIPPED | OUTPATIENT
Start: 2025-04-11

## 2025-04-11 RX ORDER — ERGOCALCIFEROL 1.25 MG/1
50000 CAPSULE, LIQUID FILLED ORAL WEEKLY
Qty: 12 CAPSULE | Refills: 1 | Status: SHIPPED | OUTPATIENT
Start: 2025-04-11

## 2025-04-11 RX ORDER — LOSARTAN POTASSIUM 25 MG/1
25 TABLET ORAL DAILY
Qty: 90 TABLET | Refills: 1 | Status: SHIPPED | OUTPATIENT
Start: 2025-04-11

## 2025-04-11 RX ORDER — ROSUVASTATIN CALCIUM 10 MG/1
10 TABLET, COATED ORAL DAILY
Qty: 90 TABLET | Refills: 1 | Status: SHIPPED | OUTPATIENT
Start: 2025-04-11

## 2025-04-11 ASSESSMENT — ENCOUNTER SYMPTOMS
NAUSEA: 0
PHOTOPHOBIA: 0
DIARRHEA: 0
EYE DISCHARGE: 0
EYE PAIN: 0
CONSTIPATION: 0
SORE THROAT: 0
SHORTNESS OF BREATH: 0
BLOOD IN STOOL: 0
BACK PAIN: 0
EYE REDNESS: 0
VOMITING: 0
ABDOMINAL PAIN: 0
STRIDOR: 0
COUGH: 0

## 2025-04-11 ASSESSMENT — PATIENT HEALTH QUESTIONNAIRE - PHQ9
6. FEELING BAD ABOUT YOURSELF - OR THAT YOU ARE A FAILURE OR HAVE LET YOURSELF OR YOUR FAMILY DOWN: NOT AT ALL
4. FEELING TIRED OR HAVING LITTLE ENERGY: NOT AT ALL
7. TROUBLE CONCENTRATING ON THINGS, SUCH AS READING THE NEWSPAPER OR WATCHING TELEVISION: NOT AT ALL
5. POOR APPETITE OR OVEREATING: NOT AT ALL
SUM OF ALL RESPONSES TO PHQ QUESTIONS 1-9: 3
3. TROUBLE FALLING OR STAYING ASLEEP: NOT AT ALL
1. LITTLE INTEREST OR PLEASURE IN DOING THINGS: NEARLY EVERY DAY
SUM OF ALL RESPONSES TO PHQ QUESTIONS 1-9: 3
2. FEELING DOWN, DEPRESSED OR HOPELESS: NOT AT ALL
9. THOUGHTS THAT YOU WOULD BE BETTER OFF DEAD, OR OF HURTING YOURSELF: NOT AT ALL
10. IF YOU CHECKED OFF ANY PROBLEMS, HOW DIFFICULT HAVE THESE PROBLEMS MADE IT FOR YOU TO DO YOUR WORK, TAKE CARE OF THINGS AT HOME, OR GET ALONG WITH OTHER PEOPLE: NOT DIFFICULT AT ALL
SUM OF ALL RESPONSES TO PHQ QUESTIONS 1-9: 3
SUM OF ALL RESPONSES TO PHQ QUESTIONS 1-9: 3
8. MOVING OR SPEAKING SO SLOWLY THAT OTHER PEOPLE COULD HAVE NOTICED. OR THE OPPOSITE, BEING SO FIGETY OR RESTLESS THAT YOU HAVE BEEN MOVING AROUND A LOT MORE THAN USUAL: NOT AT ALL

## 2025-04-11 ASSESSMENT — ANXIETY QUESTIONNAIRES
6. BECOMING EASILY ANNOYED OR IRRITABLE: NOT AT ALL
4. TROUBLE RELAXING: NOT AT ALL
3. WORRYING TOO MUCH ABOUT DIFFERENT THINGS: NOT AT ALL
2. NOT BEING ABLE TO STOP OR CONTROL WORRYING: NOT AT ALL
IF YOU CHECKED OFF ANY PROBLEMS ON THIS QUESTIONNAIRE, HOW DIFFICULT HAVE THESE PROBLEMS MADE IT FOR YOU TO DO YOUR WORK, TAKE CARE OF THINGS AT HOME, OR GET ALONG WITH OTHER PEOPLE: NOT DIFFICULT AT ALL
1. FEELING NERVOUS, ANXIOUS, OR ON EDGE: NOT AT ALL
GAD7 TOTAL SCORE: 0
5. BEING SO RESTLESS THAT IT IS HARD TO SIT STILL: NOT AT ALL
7. FEELING AFRAID AS IF SOMETHING AWFUL MIGHT HAPPEN: NOT AT ALL

## 2025-04-11 NOTE — PATIENT INSTRUCTIONS
Thank you for choosing [facility].  We know you have options when it comes to your healthcare; we appreciate that you chose us. Our goal is to provide exceptional  service and world class care to every patient.  You will be receiving a survey via email or text message asking for your feedback.  Please take a few minutes to share your thoughts about your recent visit. Your comments helps us understand what we do well and ways we can improve.  Thank you in advance for your valuable feedback.    Patient Education        Deciding About Using Medicines To Quit Smoking  How can you decide about using medicines to quit smoking?  What are the medicines you can use?  Your doctor may prescribe varenicline (Chantix) or bupropion SR. These medicines can help you cope with cravings for tobacco. They are pills that don't contain nicotine.  You also can use nicotine replacement products. These do contain nicotine. There are many types.  Gum and lozenges slowly release nicotine into your mouth.  Patches stick to your skin. They slowly release nicotine into your bloodstream.  An inhaler has a ralph that contains nicotine. You breathe in a puff of nicotine vapor through your mouth and throat.  Nasal spray releases a mist that contains nicotine.  What are key points about this decision?  Using medicines can increase your chances of quitting smoking. They can ease cravings and withdrawal symptoms.  Getting counseling along with using medicine can raise your chances of quitting even more.  These nicotine replacement products have less nicotine than cigarettes. And by itself, nicotine is not nearly as harmful as smoking. The tars, carbon monoxide, and other toxic chemicals in tobacco cause the harmful effects.  The side effects of nicotine replacement products depend on the type of product. For example, a patch can make your skin red and itchy. Medicines in pill form can make you sick to your stomach. They can also cause dry mouth and

## 2025-04-11 NOTE — PROGRESS NOTES
Dyslipidemia  rosuvastatin (CRESTOR) 10 MG tablet    Comprehensive Metabolic Panel    Lipid Panel      6. Prediabetes  Comprehensive Metabolic Panel    Albumin/Creatinine Ratio, Urine    Hemoglobin A1C    Albumin, Random Urine    metFORMIN (GLUCOPHAGE-XR) 750 MG extended release tablet    losartan (COZAAR) 25 MG tablet            Plan:     Orders Placed This Encounter   Procedures    Comprehensive Metabolic Panel    Lipid Panel    Albumin/Creatinine Ratio, Urine    Hemoglobin A1C    Albumin, Random Urine       Outpatient Encounter Medications as of 4/11/2025   Medication Sig Dispense Refill    vitamin D (ERGOCALCIFEROL) 1.25 MG (11753 UT) CAPS capsule Take 1 capsule by mouth once a week 12 capsule 1    rosuvastatin (CRESTOR) 10 MG tablet Take 1 tablet by mouth daily 90 tablet 1    metFORMIN (GLUCOPHAGE-XR) 750 MG extended release tablet Take 1 tablet by mouth daily (with breakfast) 90 tablet 1    losartan (COZAAR) 25 MG tablet Take 1 tablet by mouth daily 90 tablet 1     No facility-administered encounter medications on file as of 4/11/2025.      45 minutes spent with patient counseling/educating on acute/chronic medical health problems, medications,  along with treatment options.  Reviewed multiple labs/imaging/medications with patient during this time.  Following Diet discussion/education was done on Healthy Diet, Cholesterol Diet , Dash Diet, and Diabetic Diet.  In addition Exercise plan and depression screen were discussed.  Recent labs/imaging were discussed and reviewed with patient.

## (undated) DEVICE — GLOVE SURG SZ 75 CRM LTX FREE POLYISOPRENE POLYMER BEAD ANTI

## (undated) DEVICE — CLIP LIG M TI 6 SIL HNDL FOR OPN AND ENDOSCP SGL APPL

## (undated) DEVICE — SUTURE ETHLN SZ 4-0 L18IN NONABSORBABLE BLK L19MM PS-2 3/8 1667H

## (undated) DEVICE — 3M™ STERI-STRIP™ COMPOUND BENZOIN TINCTURE 40 BAGS/CARTON 4 CARTONS/CASE C1544: Brand: 3M™ STERI-STRIP™

## (undated) DEVICE — TOWEL,OR,DSP,ST,BLUE,DLX,XR,4/PK,20PK/CS: Brand: MEDLINE

## (undated) DEVICE — ELECTRODE PT RET AD L9FT HI MOIST COND ADH HYDRGEL CORDED

## (undated) DEVICE — GAUZE,SPONGE,FLUFF,6"X6.75",STRL,5/TRAY: Brand: MEDLINE

## (undated) DEVICE — MINOR BSIN PK

## (undated) DEVICE — SUTURE MCRYL SZ 4-0 L27IN ABSRB UD L19MM PS-2 1/2 CIR PRIM Y426H

## (undated) DEVICE — NEEDLE HYPO 25GA L1.5IN BLU POLYPR HUB S STL REG BVL STR

## (undated) DEVICE — SUTURE VCRL SZ 3-0 L27IN ABSRB UD L26MM SH 1/2 CIR J416H

## (undated) DEVICE — SUTURE PROL SZ 2-0 L30IN NONABSORBABLE BLU L26MM SH 1/2 CIR 8833H

## (undated) DEVICE — GOWN,SIRUS,NONRNF,SETINSLV,XL,20/CS: Brand: MEDLINE